# Patient Record
Sex: FEMALE | Race: WHITE | NOT HISPANIC OR LATINO | URBAN - METROPOLITAN AREA
[De-identification: names, ages, dates, MRNs, and addresses within clinical notes are randomized per-mention and may not be internally consistent; named-entity substitution may affect disease eponyms.]

---

## 2023-07-25 NOTE — TELEPHONE ENCOUNTER
Diagnosis, Referred by & from: Hemorrhoids, self-referred   Appt date: 10/4/2023   NOTES STATUS DETAILS   OFFICE NOTE from referring provider Care Everywhere [facility]:  3/24/23, 1/20/21 - [facility] OV with CHARIS Hayes   OFFICE NOTE from other specialist Received [facility]:  6/25/19, 4/29/19 - [facility] OV with Dr. Palmer  10/28/16 - [facility] OV with CHARIS Rollins   DISCHARGE SUMMARY from hospital N/A    DISCHARGE REPORT from the ER N/A    OPERATIVE REPORT Care Everywhere [facility]:  7/26/19 - OP Note for ANAL EXAM WITH BOTOX INJECTION with Dr. Palmer   MEDICATION LIST Care Everywhere    LABS N/A    DIAGNOSTIC PROCEDURES N/A    IMAGING (DISC & REPORT) N/A       Records Requested  07/25/23    Facility  [facility]  Fax: [phone]   Outcome * 7/25/23 12:39 PM [facility] - Odalys    * 8/23/23 10:24 AM Faxed 2nd urg req to [facility] for records to be faxed to the clinic. - Odalys    * 8/28/23 2:08 PM Mailed out DENY form to the patient to get the [facility] records. - Odalys    * 9/20/23 8:06 AM Signed DENY received from patient and sent urg req to [facility] with DENY attached for recs to be urgently faxed to the clinic. - Odalys    * 9/20/23 11:47 AM Records received from [facility] and sent to [facility] to be scanned into the chart. - Odalys

## 2023-09-18 ENCOUNTER — TELEPHONE (OUTPATIENT)
Dept: ENDOCRINOLOGY | Facility: CLINIC | Age: 53
End: 2023-09-18
Payer: COMMERCIAL

## 2023-09-18 NOTE — PROGRESS NOTES
Colon and Rectal Surgery Consult Clinic Note    RE: Payal Long  : 1970  VIDHI: 10/4/2023      Payal Long is a very pleasant 53 year old female with concerns for hemorrhoids. Given these findings they were subsequently sent to the [facility] for a second opinion on this and a new patient consultation.     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 History of examination under anesthesia with Botox injection for a posterior anal fissure with Dr. Palmer in 2019.  She now feels that she has something that feels up and explodes a few times a week with blood.  This happens without even having a bowel movement and she has to wear panty liner because of it.  No significant pain with or between bowel movements.  No purulent drainage.  She recently had thyroiditis from a virus and initially had diarrhea but now is having some constipation is having to take laxatives and fiber supplements for this.  No fecal incontinence.  She had a colonoscopy last in  but Cologuard was negative in the past few years.  No family history of any colon cancer.  She has had 2 vaginal births with episiotomy and 1 vacuum extraction.      PLEASE SEE NOTE BELOW FOR PHYSICAL EXAMINATION, REVIEW OF SYSTEMS, AND OTHER HISTORY.    Assessment/Plan: 53 year old female with mucosal prolapse.  On exam she has hemorrhoidal/mucosal prolapse in the anterior position.  I think this is likely the source of the intermittent bleeding.  We discussed management with surgical intervention versus hemorrhoid banding and discussed the possible need for serial banding if symptoms do not resolve or further surgical intervention.  She stated an understanding of this and wished to proceed with banding today.  She tolerated this well.  We will have her return to clinic after 1 month if she has any recurrent symptoms.  I did advise a colonoscopy since her last was in  and she is  agreeable to this.    15 minutes spent on the date of encounter performing chart review, history and exam, documentation and further activities as noted above with an additional 5 minutes for anoscopy and banding.     Corrina Jones MD  Colon and Rectal Surgery Staff  [facility]      -------------------------------------------------------------------------------------------------------------------      Medical history:  No past medical history on file.    Surgical history:  No past surgical history on file.    Problem list:  There are no problems to display for this patient.      Medications:  Current Outpatient Medications   Medication Sig Dispense Refill    atenolol (TENORMIN) 25 MG tablet Take 1 tablet by mouth daily      traZODone (DESYREL) 50 MG tablet Take 50 mg by mouth      methimazole (TAPAZOLE) 5 MG tablet TAKE 4 TABLETS BY MOUTH TWICE DAILY         Allergies:  No Known Allergies    Family history:  No family history on file.    Social history:  Social History     Socioeconomic History    Marital status:      Spouse name: Not on file    Number of children: Not on file    Years of education: Not on file    Highest education level: Not on file   Occupational History    Not on file   Tobacco Use    Smoking status: Never    Smokeless tobacco: Never   Substance and Sexual Activity    Alcohol use: Not on file    Drug use: Not on file    Sexual activity: Not on file   Other Topics Concern    Not on file   Social History Narrative    Not on file     Social Determinants of Health     Financial Resource Strain: Not on file   Food Insecurity: Not on file   Transportation Needs: Not on file   Physical Activity: Not on file   Stress: Not on file   Social Connections: Not on file   Interpersonal Safety: Not on file   Housing Stability: Not on file         Nursing Notes:   Orion Larsen, EMT  10/4/2023  3:06 PM  Signed  Chief Complaint   Patient presents with    Consult        Vitals:    10/04/23 1502   BP: 118/76   BP Location: Left arm   Patient Position: Sitting   Cuff Size: Adult Regular   Pulse: 82   SpO2: 97%       There is no height or weight on file to calculate BMI.    Orion Larsen EMT-P       Physical Examination:  /76 (BP Location: Left arm, Patient Position: Sitting, Cuff Size: Adult Regular)   Pulse 82   SpO2 97%   General: Alert, oriented, in no acute distress, sitting comfortably    Perianal external examination:  Perianal skin: Intact with no excoriation or lichenification.  Lesions: No evidence of an external lesion, nodularity, or induration in the perianal region.  Eversion of buttocks: There was not evidence of an anal fissure. Details: N/A.  Skin tags or external hemorrhoids: Yes: hemorrhoidal prolapse in the anterior position without bleeding. This was easily manually reducible.    Digital rectal examination: Was performed.   Sphincter tone: Good.  Palpable lesions: No.  Other: None.  Bimanual examination: was not performed.    Anoscopy: Was performed.   Hemorrhoids: Yes. Prolapsing hemorrhoid without bleeding in the anterior position. Smaller hemorrhoid in the right lateral position  Lesions: No.      Procedures:  After discussing the risks and benefits, the patient agreed to proceed with internal hemorrhoidal banding.    Prior to the start of the procedure and with procedural staff participation, I verbally confirmed the patient s identity using two indicators, relevant allergies, that the procedure was appropriate and matched the consent or emergent situation, and that the correct equipment/implants were available. Immediately prior to starting the procedure I conducted the Time Out with the procedural staff and re-confirmed the patient s name, procedure, and site/side. (The Joint Commission universal protocol was followed.)  Yes    Sedation (Moderate or Deep): None    A suction hemorrhoidal  was used to place a total of 1 band(s) in the  anterior position(s).    There was no significant bleeding. The patient tolerated the procedure well.    This procedure was performed under a collaborative privileging agreement with Dr. Reggie Diaz, Chief Division of Colon and Rectal Surgery

## 2023-09-18 NOTE — TELEPHONE ENCOUNTER
Call placed to Payal as we received the following referral  message -   We have a received a new appointment request for the [facility] Rare Disease team:  First Name: Payal  Last Name: Ethan  YOB: 1970  Email: [email or web address]  Phone: [phone]  Address: [address] [place] [address]  Condition: Hyperthyroid Disease  Requested Provider: Dr. Samir Long  Reason for Appointment: Thyroid issue- At [facility], have been diagnosed with hyperthyroid disease 09/09/23 need an endocrinologist ALPESH Zelaya,  Team [facility]    I explained that Dr. Andre Long is a pediatric endocrinologist.  Payal was not sure where the referral came from but that her  has been helping her look around for an endocrine provider.  I gave Payal the number to schedule with Adult endocrinology here at the [facility] if she would like to do that.   She was appreciative of the call.

## 2023-10-04 ENCOUNTER — PRE VISIT (OUTPATIENT)
Dept: SURGERY | Facility: CLINIC | Age: 53
End: 2023-10-04

## 2023-10-04 ENCOUNTER — OFFICE VISIT (OUTPATIENT)
Dept: SURGERY | Facility: CLINIC | Age: 53
End: 2023-10-04
Payer: COMMERCIAL

## 2023-10-04 VITALS — OXYGEN SATURATION: 97 % | DIASTOLIC BLOOD PRESSURE: 76 MMHG | HEART RATE: 82 BPM | SYSTOLIC BLOOD PRESSURE: 118 MMHG

## 2023-10-04 DIAGNOSIS — K64.8 HEMORRHOID PROLAPSE: ICD-10-CM

## 2023-10-04 DIAGNOSIS — Z12.11 ENCOUNTER FOR SCREENING COLONOSCOPY: Primary | ICD-10-CM

## 2023-10-04 PROCEDURE — 46221 LIGATION OF HEMORRHOID(S): CPT | Performed by: COLON & RECTAL SURGERY

## 2023-10-04 PROCEDURE — 99202 OFFICE O/P NEW SF 15 MIN: CPT | Mod: 25 | Performed by: COLON & RECTAL SURGERY

## 2023-10-04 RX ORDER — ATENOLOL 25 MG/1
1 TABLET ORAL DAILY
COMMUNITY
Start: 2023-09-12

## 2023-10-04 RX ORDER — TRAZODONE HYDROCHLORIDE 50 MG/1
50 TABLET, FILM COATED ORAL
COMMUNITY
Start: 2023-03-24

## 2023-10-04 RX ORDER — METHIMAZOLE 5 MG/1
TABLET ORAL
COMMUNITY

## 2023-10-04 ASSESSMENT — ENCOUNTER SYMPTOMS
SYNCOPE: 0
SLEEP DISTURBANCES DUE TO BREATHING: 0
WEIGHT LOSS: 1
DECREASED APPETITE: 1
FATIGUE: 1
JAUNDICE: 0
FEVER: 1
HYPERTENSION: 0
PANIC: 0
NAIL CHANGES: 0
NIGHT SWEATS: 1
HEARTBURN: 0
SMELL DISTURBANCE: 0
INCREASED ENERGY: 1
SKIN CHANGES: 0
HYPOTENSION: 0
CONSTIPATION: 1
TASTE DISTURBANCE: 0
TROUBLE SWALLOWING: 0
BOWEL INCONTINENCE: 0
BLOOD IN STOOL: 0
DIARRHEA: 1
ABDOMINAL PAIN: 1
CHILLS: 0
BLOATING: 0
INSOMNIA: 1
VOMITING: 0
POLYPHAGIA: 0
WEIGHT GAIN: 0
NAUSEA: 0
SINUS CONGESTION: 1
DEPRESSION: 0
LEG PAIN: 1
RECTAL PAIN: 1
PALPITATIONS: 1
DECREASED CONCENTRATION: 1
SINUS PAIN: 0
POLYDIPSIA: 0
SORE THROAT: 0
ALTERED TEMPERATURE REGULATION: 1
ORTHOPNEA: 0
HALLUCINATIONS: 0
EXERCISE INTOLERANCE: 1
LIGHT-HEADEDNESS: 0
NERVOUS/ANXIOUS: 1
NECK MASS: 1
HOARSE VOICE: 1

## 2023-10-04 ASSESSMENT — PAIN SCALES - GENERAL: PAINLEVEL: NO PAIN (0)

## 2023-10-04 NOTE — NURSING NOTE
Chief Complaint   Patient presents with    Consult       Vitals:    10/04/23 1502   BP: 118/76   BP Location: Left arm   Patient Position: Sitting   Cuff Size: Adult Regular   Pulse: 82   SpO2: 97%       There is no height or weight on file to calculate BMI.    Orion Larsen EMT-P

## 2023-10-04 NOTE — LETTER
10/4/2023       RE: Payal Long  [address] [place] [address]     Dear Colleague,    Thank you for referring your patient, Payal Long, to the [facility] [place] at [facility]. Please see a copy of my visit note below.    Colon and Rectal Surgery Consult Clinic Note    RE: Payal Long  : 1970  VIDHI: 10/4/2023      Payal Long is a very pleasant 53 year old female with concerns for hemorrhoids. Given these findings they were subsequently sent to the [facility] for a second opinion on this and a new patient consultation.     HISTORY OF PRESENT ILNESS:  Payal has had an intermittent anal fissure since about . History of examination under anesthesia with Botox injection for a posterior anal fissure with Dr. Palmer in 2019.  She now feels that she has something that feels up and explodes a few times a week with blood.  This happens without even having a bowel movement and she has to wear panty liner because of it.  No significant pain with or between bowel movements.  No purulent drainage.  She recently had thyroiditis from a virus and initially had diarrhea but now is having some constipation is having to take laxatives and fiber supplements for this.  No fecal incontinence.  She had a colonoscopy last in  but Cologuard was negative in the past few years.  No family history of any colon cancer.  She has had 2 vaginal births with episiotomy and 1 vacuum extraction.      PLEASE SEE NOTE BELOW FOR PHYSICAL EXAMINATION, REVIEW OF SYSTEMS, AND OTHER HISTORY.    Assessment/Plan: 53 year old female with mucosal prolapse.  On exam she has hemorrhoidal/mucosal prolapse in the anterior position.  I think this is likely the source of the intermittent bleeding.  We discussed management with surgical intervention versus hemorrhoid banding and discussed the possible need for serial  banding if symptoms do not resolve or further surgical intervention.  She stated an understanding of this and wished to proceed with banding today.  She tolerated this well.  We will have her return to clinic after 1 month if she has any recurrent symptoms.  I did advise a colonoscopy since her last was in 2010 and she is agreeable to this.    15 minutes spent on the date of encounter performing chart review, history and exam, documentation and further activities as noted above with an additional 5 minutes for anoscopy and banding.     Corrina Jones MD  Colon and Rectal Surgery Staff  [facility]      -------------------------------------------------------------------------------------------------------------------      Medical history:  No past medical history on file.    Surgical history:  No past surgical history on file.    Problem list:  There are no problems to display for this patient.      Medications:  Current Outpatient Medications   Medication Sig Dispense Refill    atenolol (TENORMIN) 25 MG tablet Take 1 tablet by mouth daily      traZODone (DESYREL) 50 MG tablet Take 50 mg by mouth      methimazole (TAPAZOLE) 5 MG tablet TAKE 4 TABLETS BY MOUTH TWICE DAILY         Allergies:  No Known Allergies    Family history:  No family history on file.    Social history:  Social History     Socioeconomic History    Marital status:      Spouse name: Not on file    Number of children: Not on file    Years of education: Not on file    Highest education level: Not on file   Occupational History    Not on file   Tobacco Use    Smoking status: Never    Smokeless tobacco: Never   Substance and Sexual Activity    Alcohol use: Not on file    Drug use: Not on file    Sexual activity: Not on file   Other Topics Concern    Not on file   Social History Narrative    Not on file     Social Determinants of Health     Financial Resource Strain: Not on file   Food Insecurity: Not on file    Transportation Needs: Not on file   Physical Activity: Not on file   Stress: Not on file   Social Connections: Not on file   Interpersonal Safety: Not on file   Housing Stability: Not on file         Nursing Notes:   Orion Larsen, EMT  10/4/2023  3:06 PM  Signed  Chief Complaint   Patient presents with    Consult       Vitals:    10/04/23 1502   BP: 118/76   BP Location: Left arm   Patient Position: Sitting   Cuff Size: Adult Regular   Pulse: 82   SpO2: 97%       There is no height or weight on file to calculate BMI.    Orion Larsen EMT-P       Physical Examination:  /76 (BP Location: Left arm, Patient Position: Sitting, Cuff Size: Adult Regular)   Pulse 82   SpO2 97%   General: Alert, oriented, in no acute distress, sitting comfortably    Perianal external examination:  Perianal skin: Intact with no excoriation or lichenification.  Lesions: No evidence of an external lesion, nodularity, or induration in the perianal region.  Eversion of buttocks: There was not evidence of an anal fissure. Details: N/A.  Skin tags or external hemorrhoids: Yes: hemorrhoidal prolapse in the anterior position without bleeding. This was easily manually reducible.    Digital rectal examination: Was performed.   Sphincter tone: Good.  Palpable lesions: No.  Other: None.  Bimanual examination: was not performed.    Anoscopy: Was performed.   Hemorrhoids: Yes. Prolapsing hemorrhoid without bleeding in the anterior position. Smaller hemorrhoid in the right lateral position  Lesions: No.      Procedures:  After discussing the risks and benefits, the patient agreed to proceed with internal hemorrhoidal banding.    Prior to the start of the procedure and with procedural staff participation, I verbally confirmed the patient s identity using two indicators, relevant allergies, that the procedure was appropriate and matched the consent or emergent situation, and that the correct equipment/implants were available. Immediately prior to  starting the procedure I conducted the Time Out with the procedural staff and re-confirmed the patient s name, procedure, and site/side. (The Joint Commission universal protocol was followed.)  Yes    Sedation (Moderate or Deep): None    A suction hemorrhoidal  was used to place a total of 1 band(s) in the anterior position(s).    There was no significant bleeding. The patient tolerated the procedure well.    This procedure was performed under a collaborative privileging agreement with Dr. Reggie Diaz, Chief Division of Colon and Rectal Surgery        Again, thank you for allowing me to participate in the care of your patient.      Sincerely,    Corrina Jones MD

## 2023-10-31 ENCOUNTER — TELEPHONE (OUTPATIENT)
Dept: GASTROENTEROLOGY | Facility: CLINIC | Age: 53
End: 2023-10-31
Payer: COMMERCIAL

## 2023-10-31 NOTE — TELEPHONE ENCOUNTER
"Endoscopy Scheduling Screen    Have you had a positive Covid test in the last 14 days?  No    Are you active on MyChart?   Yes    What insurance is in the chart?  Other:  [employer]    Ordering/Referring Provider:     GLENNA GIFFORD      (If ordering provider performs procedure, schedule with ordering provider unless otherwise instructed. )    BMI: 21.63     Sedation Ordered  moderate sedation.   If patient BMI > 50 do not schedule in [facility].    If patient BMI > 45 do not schedule at [facility].    Are you taking methadone or Suboxone?  No    Are you taking any prescription medications for pain 3 or more times per week?   No    Do you have a history of malignant hyperthermia or adverse reaction to anesthesia?  Yes (Continue with scheduling. Nurse will notify anesthesia at scheduled location for eval.) - PT SAID SHE IS ALLERGIC TO FENTANYL     (Females) Are you currently pregnant?   No     Have you been diagnosed or told you have pulmonary hypertension?   No    Do you have an LVAD?  No    Have you been told you have moderate to severe sleep apnea?  No    Have you been told you have COPD, asthma, or any other lung disease?  No    Do you have any heart conditions?  No     Have you ever had an organ transplant?   No    Have you ever had or are you awaiting a heart or lung transplant?   No    Have you had a stroke or transient ischemic attack (TIA aka \"mini stroke\" in the last 6 months?   No    Have you been diagnosed with or been told you have cirrhosis of the liver?   No    Are you currently on dialysis?   No    Do you need assistance transferring?   No    BMI: 21.63     Is patients BMI > 40 and scheduling location [facility]?  No    Do you take an injectable medication for weight loss or diabetes (excluding insulin)?  No    Do you take the medication Naltrexone?  No    Do you take blood thinners?  No       Prep   Are you currently on dialysis or do you have chronic kidney disease?  No    Do you have a diagnosis of " diabetes?  No    Do you have a diagnosis of cystic fibrosis (CF)?  No    On a regular basis do you go 3 -5 days between bowel movements?  No    BMI > 40?  No    Preferred Pharmacy:    [id number] - [place], [place] - [address] AT [facility] [address]  [place] [address]  Phone: [phone] Fax: [phone]      Final Scheduling Details   MESSAGE SENT TO MI MORAN TO DETERMINE WHAT DATES DR. CLEMENT IS AVAILABLE. PT HAD TO GO BACK TO WORK AND SAID THEY WOULD CALL BACK LATER TO FINISH SCHEDULING.

## 2023-11-01 NOTE — TELEPHONE ENCOUNTER
1st attempt, LVM TO CALL BACK. MESSAGE BACK FROM DR. CLEMENT STAFF WAS TO SCHEDULE PT WITH ANY PROVIDER.

## 2023-11-08 ENCOUNTER — OFFICE VISIT (OUTPATIENT)
Dept: SURGERY | Facility: CLINIC | Age: 53
End: 2023-11-08
Payer: COMMERCIAL

## 2023-11-08 VITALS — HEART RATE: 88 BPM | DIASTOLIC BLOOD PRESSURE: 72 MMHG | OXYGEN SATURATION: 100 % | SYSTOLIC BLOOD PRESSURE: 117 MMHG

## 2023-11-08 DIAGNOSIS — K64.8 HEMORRHOID PROLAPSE: Primary | ICD-10-CM

## 2023-11-08 PROCEDURE — 46221 LIGATION OF HEMORRHOID(S): CPT

## 2023-11-08 ASSESSMENT — PAIN SCALES - GENERAL: PAINLEVEL: NO PAIN (0)

## 2023-11-08 NOTE — LETTER
2023       RE: Payal Long  [address] [place] [address]     Dear Colleague,    Thank you for referring your patient, Payal Long, to the [facility] [place] at [facility]. Please see a copy of my visit note below.    Colon and Rectal Surgery Follow-Up Clinic Note    RE: Payal Long  : 1970  VIDHI: 2023    Payal Long is a very pleasant 53 year old female here for follow-up of internal hemorrhoids.    Interval history: Payal was last in clinic a month ago and had banding x1 with Dr. Jones. She has been doing well. She still has rectal bleeding and is unsure if the banding improved this. The last time she had bleeding was 2 weeks ago and it lasted for a week. She did have a small amount of rectal pain during that time too.     Physical Examination: Exam was chaperoned by Orion Larsen, EMT-P   /72 (BP Location: Left arm, Patient Position: Sitting, Cuff Size: Adult Regular)   Pulse 88   SpO2 100%   General: Alert, oriented, in no acute distress, sitting comfortably  Perianal external examination:  Perianal skin: Intact with no excoriation or lichenification.  Lesions: No evidence of an external lesion, nodularity, or induration in the perianal region.  Eversion of buttocks: There was not evidence of an anal fissure. Details: N/A.  Skin tags or external hemorrhoids: Yes: small hemorrhoidal prolapse in the anterior position without bleeding.      Digital rectal examination: Was performed.   Sphincter tone: Good.  Palpable lesions: No.  Other: None.  Bimanual examination: was not performed.     Anoscopy: Was performed.   Hemorrhoids: Yes. Moderate sized hemorrhoid in RA  Lesions: No.    Procedures:  After discussing the risks and benefits, the patient agreed to proceed with internal hemorrhoidal banding.    Prior to the start of the procedure and with procedural staff  participation, I verbally confirmed the patient s identity using two indicators, relevant allergies, that the procedure was appropriate and matched the consent or emergent situation, and that the correct equipment/implants were available. Immediately prior to starting the procedure I conducted the Time Out with the procedural staff and re-confirmed the patient s name, procedure, and site/side. (The Joint Commission universal protocol was followed.)  Yes    Sedation (Moderate or Deep): None    A suction hemorrhoidal  was used to place a total of 1 band(s) in the right anterior position(s).    There was no significant bleeding. The patient tolerated the procedure well.    Procedure was performed under collaborating agreement with Dr. Reggie Diaz MD, Chief of the Division of Colon and Rectal Surgery      Assessment/Plan: 53 year old female with symptomatic internal hemorrhoids. She did have some recurrent bleeding but the timing of this might be related to the band falling off. We discussed repeating banding today and she is interested. Again discussed risks and expectations. See procedure note above. No aspirin for 2 weeks. Recommend a daily fiber supplement. Follow up as needed. Patient's questions were answered to her stated satisfaction and she is in agreement with this plan.     Medical history:  No past medical history on file.    Surgical history:  No past surgical history on file.    Problem list:  There are no problems to display for this patient.      Medications:  Current Outpatient Medications   Medication Sig Dispense Refill    atenolol (TENORMIN) 25 MG tablet Take 1 tablet by mouth daily      methimazole (TAPAZOLE) 5 MG tablet TAKE 4 TABLETS BY MOUTH TWICE DAILY      traZODone (DESYREL) 50 MG tablet Take 50 mg by mouth         Allergies:  No Known Allergies    Family history:  No family history on file.    Social history:  Social History     Tobacco Use    Smoking status: Never    Smokeless  tobacco: Never   Substance Use Topics    Alcohol use: Not on file     Marital status: .  Occupation: .    Nursing Notes:   Orion Larsen, EMT  11/8/2023  4:06 PM  Signed  Chief Complaint   Patient presents with    Follow Up       Vitals:    11/08/23 1605   BP: 117/72   BP Location: Left arm   Patient Position: Sitting   Cuff Size: Adult Regular   Pulse: 88   SpO2: 100%     There is no height or weight on file to calculate BMI.    Orion Larsen EMT-P    15 minutes spent on the date of encounter performing chart review, history and exam, documentation and further activities as noted above with an additional 5 minutes for anoscopy and banding.     Again, thank you for allowing me to participate in the care of your patient.      Sincerely,    Glendy Fagan PA-C

## 2023-11-08 NOTE — PROGRESS NOTES
Colon and Rectal Surgery Follow-Up Clinic Note    RE: Payal Long  : 1970  VIDHI: 2023    Payal Long is a very pleasant 53 year old female here for follow-up of internal hemorrhoids.    Interval history: Payal was last in clinic a month ago and had banding x1 with Dr. Jones. She has been doing well. She still has rectal bleeding and is unsure if the banding improved this. The last time she had bleeding was 2 weeks ago and it lasted for a week. She did have a small amount of rectal pain during that time too.     Physical Examination: Exam was chaperoned by Orion Larsen, EMT-P   /72 (BP Location: Left arm, Patient Position: Sitting, Cuff Size: Adult Regular)   Pulse 88   SpO2 100%   General: Alert, oriented, in no acute distress, sitting comfortably  Perianal external examination:  Perianal skin: Intact with no excoriation or lichenification.  Lesions: No evidence of an external lesion, nodularity, or induration in the perianal region.  Eversion of buttocks: There was not evidence of an anal fissure. Details: N/A.  Skin tags or external hemorrhoids: Yes: small hemorrhoidal prolapse in the anterior position without bleeding.      Digital rectal examination: Was performed.   Sphincter tone: Good.  Palpable lesions: No.  Other: None.  Bimanual examination: was not performed.     Anoscopy: Was performed.   Hemorrhoids: Yes. Moderate sized hemorrhoid in RA  Lesions: No.    Procedures:  After discussing the risks and benefits, the patient agreed to proceed with internal hemorrhoidal banding.    Prior to the start of the procedure and with procedural staff participation, I verbally confirmed the patient s identity using two indicators, relevant allergies, that the procedure was appropriate and matched the consent or emergent situation, and that the correct equipment/implants were available. Immediately prior to starting the procedure I conducted the Time Out with the procedural staff and  re-confirmed the patient s name, procedure, and site/side. (The Joint Commission universal protocol was followed.)  Yes    Sedation (Moderate or Deep): None    A suction hemorrhoidal  was used to place a total of 1 band(s) in the right anterior position(s).    There was no significant bleeding. The patient tolerated the procedure well.    Procedure was performed under collaborating agreement with Dr. Reggie Diaz MD, Chief of the Division of Colon and Rectal Surgery      Assessment/Plan: 53 year old female with symptomatic internal hemorrhoids. She did have some recurrent bleeding but the timing of this might be related to the band falling off. We discussed repeating banding today and she is interested. Again discussed risks and expectations. See procedure note above. No aspirin for 2 weeks. Recommend a daily fiber supplement. Follow up as needed. Patient's questions were answered to her stated satisfaction and she is in agreement with this plan.     Medical history:  No past medical history on file.    Surgical history:  No past surgical history on file.    Problem list:  There are no problems to display for this patient.      Medications:  Current Outpatient Medications   Medication Sig Dispense Refill    atenolol (TENORMIN) 25 MG tablet Take 1 tablet by mouth daily      methimazole (TAPAZOLE) 5 MG tablet TAKE 4 TABLETS BY MOUTH TWICE DAILY      traZODone (DESYREL) 50 MG tablet Take 50 mg by mouth         Allergies:  No Known Allergies    Family history:  No family history on file.    Social history:  Social History     Tobacco Use    Smoking status: Never    Smokeless tobacco: Never   Substance Use Topics    Alcohol use: Not on file     Marital status: .  Occupation: .    Nursing Notes:   Orion Larsen, EMT  11/8/2023  4:06 PM  Signed  Chief Complaint   Patient presents with    Follow Up       Vitals:    11/08/23 1605   BP: 117/72   BP Location: Left arm   Patient Position:  Sitting   Cuff Size: Adult Regular   Pulse: 88   SpO2: 100%       There is no height or weight on file to calculate BMI.    Orion Larsen EMT-P      15 minutes spent on the date of encounter performing chart review, history and exam, documentation and further activities as noted above with an additional 5 minutes for anoscopy and banding.     -----------------------------------------------------  Glendy Fagan PA-C  Colon and Rectal Surgery  [facility]

## 2023-11-08 NOTE — NURSING NOTE
Chief Complaint   Patient presents with    Follow Up       Vitals:    11/08/23 1605   BP: 117/72   BP Location: Left arm   Patient Position: Sitting   Cuff Size: Adult Regular   Pulse: 88   SpO2: 100%       There is no height or weight on file to calculate BMI.    Orion Larsen EMT-P

## 2023-12-24 ENCOUNTER — HEALTH MAINTENANCE LETTER (OUTPATIENT)
Age: 53
End: 2023-12-24

## 2024-03-01 ENCOUNTER — TELEPHONE (OUTPATIENT)
Dept: GASTROENTEROLOGY | Facility: CLINIC | Age: 54
End: 2024-03-01
Payer: COMMERCIAL

## 2024-03-01 NOTE — TELEPHONE ENCOUNTER
"Endoscopy Scheduling Screen    Have you had a positive Covid test in the last 14 days?  No    Are you active on MyChart?   No    What insurance is in the chart?  Other:  [employer]    Ordering/Referring Provider:   GLENNA GIFFORD        (If ordering provider performs procedure, schedule with ordering provider unless otherwise instructed. )    BMI: There is no height or weight on file to calculate BMI.     21.63    Sedation Ordered  moderate sedation.   If patient BMI > 50 do not schedule in [facility].    If patient BMI > 45 do not schedule at [facility].    Are you taking methadone or Suboxone?  No    Have you had difficulties, pain, or discomfort during past endoscopy procedures?  No    Are you taking any prescription medications for pain 3 or more times per week?   NO - No RN review required.    Do you have a history of malignant hyperthermia or adverse reaction to anesthesia?  Yes (Continue with scheduling. Nurse will notify anesthesia at scheduled location for eval.)    Have you been diagnosed or told you have pulmonary hypertension?   No    Do you have an LVAD?  No    Have you been told you have moderate to severe sleep apnea?  No    Have you been told you have COPD, asthma, or any other lung disease?  No    Do you have any heart conditions?  No     Have you ever had an organ transplant?   No    Have you ever had or are you awaiting a heart or lung transplant?   No    Have you had a stroke or transient ischemic attack (TIA aka \"mini stroke\" in the last 6 months?   No    Have you been diagnosed with or been told you have cirrhosis of the liver?   No    Are you currently on dialysis?   No    Do you need assistance transferring?   No    BMI: There is no height or weight on file to calculate BMI.     21.63    Is patients BMI > 40 and scheduling location [facility]?  No    Do you take an injectable medication for weight loss or diabetes (excluding insulin)?  No    Do you take the medication Naltrexone?  No    Do you take " blood thinners?  No       Prep   Are you currently on dialysis or do you have chronic kidney disease?  No    Do you have a diagnosis of diabetes?  No    Do you have a diagnosis of cystic fibrosis (CF)?  No    On a regular basis do you go 3 -5 days between bowel movements?  No    BMI > 40?  No    Preferred Pharmacy:    [id number] - [place], [place] - [address] [place] [address] AT [facility] [address]  [address] [place] [address]  [place] [address]  Phone: [phone] Fax: [phone]      Final Scheduling Details   Colonoscopy prep sent?  N/A    Procedure scheduled  Colonoscopy    Surgeon:  ALONSO     Date of procedure:  06/10/2024     Pre-OP / PAC:   No - Not required for this site.    Location  [facility] - Patient preference.    Sedation   Moderate Sedation - Per order.      Patient Reminders:   You will receive a call from a Nurse to review instructions and health history.  This assessment must be completed prior to your procedure.  Failure to complete the Nurse assessment may result in the procedure being cancelled.      On the day of your procedure, please designate an adult(s) who can drive you home stay with you for the next 24 hours. The medicines used in the exam will make you sleepy. You will not be able to drive.      You cannot take public transportation, ride share services, or non-medical taxi service without a responsible caregiver.  Medical transport services are allowed with the requirement that a responsible caregiver will receive you at your destination.  We require that drivers and caregivers are confirmed prior to your procedure.

## 2024-03-21 ENCOUNTER — LAB REQUISITION (OUTPATIENT)
Dept: LAB | Facility: CLINIC | Age: 54
End: 2024-03-21

## 2024-03-21 DIAGNOSIS — N93.9 ABNORMAL UTERINE AND VAGINAL BLEEDING, UNSPECIFIED: ICD-10-CM

## 2024-03-21 DIAGNOSIS — R32 UNSPECIFIED URINARY INCONTINENCE: ICD-10-CM

## 2024-03-21 PROCEDURE — 88305 TISSUE EXAM BY PATHOLOGIST: CPT | Performed by: PATHOLOGY

## 2024-03-21 PROCEDURE — 87086 URINE CULTURE/COLONY COUNT: CPT | Performed by: OBSTETRICS & GYNECOLOGY

## 2024-03-23 LAB — BACTERIA UR CULT: NORMAL

## 2024-03-25 LAB
PATH REPORT.COMMENTS IMP SPEC: NORMAL
PATH REPORT.COMMENTS IMP SPEC: NORMAL
PATH REPORT.FINAL DX SPEC: NORMAL
PATH REPORT.GROSS SPEC: NORMAL
PATH REPORT.MICROSCOPIC SPEC OTHER STN: NORMAL
PATH REPORT.RELEVANT HX SPEC: NORMAL
PHOTO IMAGE: NORMAL

## 2024-05-12 ENCOUNTER — HEALTH MAINTENANCE LETTER (OUTPATIENT)
Age: 54
End: 2024-05-12

## 2024-05-29 ENCOUNTER — TELEPHONE (OUTPATIENT)
Dept: GASTROENTEROLOGY | Facility: CLINIC | Age: 54
End: 2024-05-29
Payer: COMMERCIAL

## 2024-05-29 NOTE — TELEPHONE ENCOUNTER
Attempted to contact patient in order to complete pre assessment questions.     No answer. Left message to return call to [phone] option 4    Callback required communication sent via [employer].    Pily Lu RN  Endoscopy Procedure Pre Assessment

## 2024-05-29 NOTE — TELEPHONE ENCOUNTER
Pre visit planning completed.      Procedure details:    Patient scheduled for Colonoscopy  on 6/10/2024.     Arrival time: 1245. Procedure time 1345    Facility location: [facility]; [address] [place] [address], [address] Floor, [place] [address]. Check in location: Main entrance at registration desk.    Sedation type: Conscious sedation     Pre op exam needed? N/A    Indication for procedure: Encounter for screening colonoscopy [Z12.11]       Chart review:     Electronic implanted devices? No    Recent diagnosis of diverticulitis within the last 6 weeks? No    Diabetic? No      Medication review:    Anticoagulants? No    NSAIDS? Yes.  Meloxicam (Mobic).  Holding interval of 10 days.    Other medication HOLDING recommendations:  N/A      Prep for procedure:     Bowel prep recommendation: Standard Miralax  Due to: standard bowel prep.    Prep instructions sent via [employer]         Pily Lu RN  Endoscopy Procedure Pre Assessment RN  [phone] option 4

## 2024-05-30 NOTE — TELEPHONE ENCOUNTER
Pre assessment completed for upcoming procedure.   (Please see previous telephone encounter notes for complete details)    Patient  returned call.       Procedure details:    Arrival time and facility location reviewed.    Pre op exam needed? N/A    Designated  policy reviewed. Instructed to have someone stay 6  hours post procedure.       Medication review:    NSAID medication(s): Meloxicam (Mobic): HOLD 10 days before procedure.      Prep for procedure:     Procedure prep instructions reviewed.        Any additional information needed:  N/A      Patient  verbalized understanding and had no questions or concerns at this time.      Meggan Hutchison RN  Endoscopy Procedure Pre Assessment   [phone] option 4

## 2024-06-10 ENCOUNTER — HOSPITAL ENCOUNTER (OUTPATIENT)
Facility: CLINIC | Age: 54
Discharge: HOME | End: 2024-06-10
Attending: SURGERY | Admitting: SURGERY
Payer: COMMERCIAL

## 2024-06-10 VITALS
SYSTOLIC BLOOD PRESSURE: 96 MMHG | RESPIRATION RATE: 16 BRPM | HEART RATE: 94 BPM | OXYGEN SATURATION: 99 % | DIASTOLIC BLOOD PRESSURE: 69 MMHG

## 2024-06-10 LAB — COLONOSCOPY: NORMAL

## 2024-06-10 PROCEDURE — G0121 COLON CA SCRN NOT HI RSK IND: HCPCS | Performed by: SURGERY

## 2024-06-10 PROCEDURE — 45378 DIAGNOSTIC COLONOSCOPY: CPT | Performed by: SURGERY

## 2024-06-10 PROCEDURE — 99153 MOD SED SAME PHYS/QHP EA: CPT | Performed by: SURGERY

## 2024-06-10 PROCEDURE — 250N000011 HC RX IP 250 OP 636: Mod: JZ | Performed by: SURGERY

## 2024-06-10 PROCEDURE — G0500 MOD SEDAT ENDO SERVICE >5YRS: HCPCS | Performed by: SURGERY

## 2024-06-10 RX ORDER — NALOXONE HYDROCHLORIDE 0.4 MG/ML
0.4 INJECTION, SOLUTION INTRAMUSCULAR; INTRAVENOUS; SUBCUTANEOUS
Status: DISCONTINUED | OUTPATIENT
Start: 2024-06-10 | End: 2024-06-10 | Stop reason: HOSPADM

## 2024-06-10 RX ORDER — LIDOCAINE 40 MG/G
CREAM TOPICAL
Status: DISCONTINUED | OUTPATIENT
Start: 2024-06-10 | End: 2024-06-10 | Stop reason: HOSPADM

## 2024-06-10 RX ORDER — DIPHENHYDRAMINE HYDROCHLORIDE 50 MG/ML
INJECTION INTRAMUSCULAR; INTRAVENOUS PRN
Status: DISCONTINUED | OUTPATIENT
Start: 2024-06-10 | End: 2024-06-10 | Stop reason: HOSPADM

## 2024-06-10 RX ORDER — PROCHLORPERAZINE MALEATE 5 MG
10 TABLET ORAL EVERY 6 HOURS PRN
Status: DISCONTINUED | OUTPATIENT
Start: 2024-06-10 | End: 2024-06-10 | Stop reason: HOSPADM

## 2024-06-10 RX ORDER — ONDANSETRON 4 MG/1
4 TABLET, ORALLY DISINTEGRATING ORAL EVERY 6 HOURS PRN
Status: DISCONTINUED | OUTPATIENT
Start: 2024-06-10 | End: 2024-06-10 | Stop reason: HOSPADM

## 2024-06-10 RX ORDER — FENTANYL CITRATE 50 UG/ML
INJECTION, SOLUTION INTRAMUSCULAR; INTRAVENOUS PRN
Status: DISCONTINUED | OUTPATIENT
Start: 2024-06-10 | End: 2024-06-10 | Stop reason: HOSPADM

## 2024-06-10 RX ORDER — ONDANSETRON 2 MG/ML
4 INJECTION INTRAMUSCULAR; INTRAVENOUS EVERY 6 HOURS PRN
Status: DISCONTINUED | OUTPATIENT
Start: 2024-06-10 | End: 2024-06-10 | Stop reason: HOSPADM

## 2024-06-10 RX ORDER — ONDANSETRON 2 MG/ML
INJECTION INTRAMUSCULAR; INTRAVENOUS PRN
Status: DISCONTINUED | OUTPATIENT
Start: 2024-06-10 | End: 2024-06-10 | Stop reason: HOSPADM

## 2024-06-10 RX ORDER — FLUMAZENIL 0.1 MG/ML
0.2 INJECTION, SOLUTION INTRAVENOUS
Status: DISCONTINUED | OUTPATIENT
Start: 2024-06-10 | End: 2024-06-10 | Stop reason: HOSPADM

## 2024-06-10 RX ORDER — ACETAMINOPHEN 325 MG/1
325-650 TABLET ORAL EVERY 6 HOURS PRN
COMMUNITY

## 2024-06-10 RX ORDER — NALOXONE HYDROCHLORIDE 0.4 MG/ML
0.2 INJECTION, SOLUTION INTRAMUSCULAR; INTRAVENOUS; SUBCUTANEOUS
Status: DISCONTINUED | OUTPATIENT
Start: 2024-06-10 | End: 2024-06-10 | Stop reason: HOSPADM

## 2024-06-10 RX ORDER — ONDANSETRON 2 MG/ML
4 INJECTION INTRAMUSCULAR; INTRAVENOUS
Status: DISCONTINUED | OUTPATIENT
Start: 2024-06-10 | End: 2024-06-10 | Stop reason: HOSPADM

## 2024-06-10 ASSESSMENT — ACTIVITIES OF DAILY LIVING (ADL)
ADLS_ACUITY_SCORE: 35

## 2024-06-10 NOTE — H&P
Payal Long  [id number]  female  54 year old      Reason for procedure/surgery: screening colonoscopy    There is no problem list on file for this patient.      Past Surgical History:  History reviewed. No pertinent surgical history.    Past Medical History: No past medical history on file.    Social History:   Social History     Tobacco Use    Smoking status: Never    Smokeless tobacco: Never   Substance Use Topics    Alcohol use: Not on file       Family History: History reviewed. No pertinent family history.    Allergies:   Allergies   Allergen Reactions    Fentanyl Dizziness, Nausea and Vomiting and Other (See Comments)     When used for sedation - not had patch or PO form       Active Medications:   No current outpatient medications on file.       Systemic Review:   CONSTITUTIONAL: NEGATIVE for fever, chills, change in weight  ENT/MOUTH: NEGATIVE for ear, mouth and throat problems  RESP: NEGATIVE for significant cough or SOB  CV: NEGATIVE for chest pain, palpitations or peripheral edema    Physical Examination:   Vital Signs: /78   Pulse 94   Resp 19   SpO2 99%   GENERAL: healthy, alert and no distress  NECK: no adenopathy, no asymmetry, masses, or scars  RESP: lungs clear to auscultation - no rales, rhonchi or wheezes  CV: regular rate and rhythm, normal S1 S2, no S3 or S4, no murmur, click or rub, no peripheral edema and peripheral pulses strong  ABDOMEN: soft, nontender, no hepatosplenomegaly, no masses and bowel sounds normal  MS: no gross musculoskeletal defects noted, no edema    Plan: Appropriate to proceed as scheduled.      Ruel Yu MD, MD  6/10/2024    PCP:  Aracely Michaels

## 2024-09-16 ENCOUNTER — PRE VISIT (OUTPATIENT)
Dept: ONCOLOGY | Facility: CLINIC | Age: 54
End: 2024-09-16
Payer: COMMERCIAL

## 2024-09-16 ENCOUNTER — PATIENT OUTREACH (OUTPATIENT)
Dept: ONCOLOGY | Facility: CLINIC | Age: 54
End: 2024-09-16
Payer: COMMERCIAL

## 2024-09-16 ENCOUNTER — TRANSCRIBE ORDERS (OUTPATIENT)
Dept: OTHER | Age: 54
End: 2024-09-16

## 2024-09-16 DIAGNOSIS — D50.9 IRON DEFICIENCY ANEMIA: Primary | ICD-10-CM

## 2024-09-16 DIAGNOSIS — R71.8 ABNORMAL RBC: ICD-10-CM

## 2024-09-16 NOTE — TELEPHONE ENCOUNTER
Action    Action Taken 9/16/2024 1:40pm VIANEY     I called [facility]'s IMG Dept [phone] - unavailable. I faxed over a request for imaging.      RECORDS STATUS - ALL OTHER DIAGNOSIS      RECORDS RECEIVED FROM: [facility]    Appt Date: TBD NN WQ   NOTES STATUS DETAILS   OFFICE NOTE from referring provider SELF REFERRAL     MEDICATION LIST In EPIC    LABS     PATHOLOGY REPORTS     ANYTHING RELATED TO DIAGNOSIS CE Labs last updated on 9/4/2024

## 2024-09-16 NOTE — PROGRESS NOTES
Hematology referral reviewed for [facility], see below.    Referral reason: self referral to Dr Flores for low RBC and MCHC, records available in [facility] at [facility]    Clinical question entered by referring provider or through order transcription: Referral called in by patient, self referral, Dr. Aracely Michaels PCP suggested hematologist, per referral requesting Dr. Jitendra Flores, recs at [facility]     Referral received via: Self referral    Current abnormal labs: Available in CareEverywhere    Outreach: Call not placed to patient regarding referral.    Plan: Triage instructions updated and sent to NPS for completion.

## 2024-11-01 DIAGNOSIS — E61.1 IRON DEFICIENCY: Primary | ICD-10-CM

## 2024-11-01 NOTE — PROGRESS NOTES
Hematology/Oncology Consult Note    Payal Long MRN# [id number]   Age: 54 year old YOB: 1970          Reason for Consult:   RBC abnormality         Assessment and Plan:   Payal Long is a 54 year old     Problem list:   # Fatigue  # Night sweats  # Brain fog  # Low RBC and MCHC  # Antibody negative hyperthyroidism  # Thyromegaly without nodule  # COVID-19 infection 2021 or 2022  # History of low B12  # History of positive BRANDT  # Family history of Sjogren's syndrome and alopecia aerata     Patient has has history of low RBC and MCHC for multiple years and thus referred for further workup and evaluation by hematology. She has been increasingly fatigued over the last year and has had to cut back on her work hours because of this. She has had brain fog, intermittent night sweats, and feels off balance. Her last CBC on 9/4/24 notable for RBC 3.94, MCHC 31.8, Hgb 12.1, MCV 97. It is possible that nutrient deficiency like B12 or iron could be responsible for her symptoms. However, she had iron studies checked in November 2023 that were normal. She has never been iron deficient per chart review. She had a B12 of 237 in 2017. Most recently B12 checked in May this year and was 862. She never had a intrinsic factor antibody level checked. It is possible that she has some autoimmunity causing low B12 level in the past. She is not vegan or vegetarian.     She does not have a personal history significant for autoimmune disease, but she has had a positive BRANDT in the past. She does have intermittent dry eyes and dry mouth. She did have one salivary gland removed as her dentist found it to be enlarged and obstructive. Her mother does have Sjogren's disease and alopecia aerata. She had post viral thyroiditis in September last year with significant symptoms associated with hyperthyroidism requiring treatment with methimazole. Her anti TPO as well as anti TSI and thyroid receptor antibodies are negative at this  time. She became hypothyroid and is now euthyroid, no longer on methimazole. Her TSH and T4 were last checked on 9/4/24 and were normal. No thyroid nodules identified on ultrasound. She was following with endocrinology with Dr. Kelly but is no longer. It is possible that she does have an underlying autoimmune versus chronic inflammatory state causing her symptoms. She should have a further autoimmune workup done at this time. She also had COVID-19 infection in late 2021 or early 2022. 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 The exact etiology of her symptoms remains unclear at this time and further workup is indicated.    Summary of Recommendations:  Obtain CBC, CMP, reticulocytes, peripheral smear, protein electrophoresis  Obtain iron studies and ferritin, B12, folate, homocysteine, methylmalonic acid, and LDH  Obtain ESR, CRP, anti SSA, BRANDT, C3, C4, anti parietal cell antibody, anti intrinsic factor antibody  Follow up with virtual visit to discuss lab results in 1 month    Iwona Irvin  Medical Student [facility]  ATTENDING ATTESTATION    I was present with the medical student who participated in the service and in the documentation  of the note. I have verified the history and personally performed the physical exam and medical  decision making. I agree with the assessment and plan of care as documented in the note    Thank you for involving us in the care of this patient.     I spent a total of 60  minutes face to face with Payal Long during today's office visit. Over 50% of this time was spent counseling the patient and coordinating the care regarding anemia and fatigue and 30 minutes preparing to see the patient and  care coordination The longitudinal plan of care for the diagnosis(es)/condition(s) as documented were addressed during this visit. Due to the added complexity in care, I will continue to support Payal in the  subsequent management and with ongoing continuity of care.      Benny Flores MD          History of Present Illness:   History obtained from chart review and confirmed with patient.  Payal Long is a 54 year old her for hemology consult for RBC abnormality.    She reports that she has had low RBC and low MCHC for a while but is not sure how long. On 9/4/24 her RBC of 3.94 and MCHC 31.8. Her hemoglobin was 12.1 and MCV 97 at this time. B12 and iron studies were recently normal in the last year, but B12 has been low in the past. She does not think she has been iron deficient before. She is not vegetarian or vegan.     She reports that she has excessive fatigue at baseline that has been going on for a while. She also has had some brain fog. She endorses some shortness of breath with exertion. She has also had some night sweats, but does not soak the sheets. She attributes some of her night sweats to going through menopause. She has been more exhausted at work especially over the last year. 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 She had to request to have a 2 hour break between her classes and she feels exhausted at the end of the day. She used to be able to teach all day with no problems with fatigue. She takes trazodone before bed while helps her sleep, but doesn't wake up feeling refreshed. Normally sleeps between 6-8 hours. She has recently gained some weight but she attributes this to that she lost about 20 pounds last year with the hyperthyroidism. She said she got hyperthyroid after having a URI. She says that her thyroid is back to normal now. She is no longer on methimazole. She also has intermittent dry mouth and dry eyes. Of note she had COVID-19 infection in maybe end of 2021 or early 2022, unclear the exact date. She did not have Paxlovid.          Review of Systems:   A comprehensive ROS was performed with the patient and was found to be negative with the exception of  "that noted in the HPI above.  CONSTITUTIONAL She is chronically fatigued. Night sweats that she attributes to menopause, she does not soak the sheets. No fevers. Normal appetite. Weight gain recently, she lost 20 pounds last year when she had hyperthyroidism.   EYES No vision changes. Intermittent dry eyes.   EARS No hearing changes  RESP Short of breath with exertion. No asthma history. No cough. Recently had strep throat.   COR No known heart murmur. Had palpitations and tachycardia with hyperthyroidism. No chest pain or MI. Has elevated cholesterol, no hypertension.  GI Unable to swallow large pills, they get stuck. No trouble swallowing liquids. Intermittent acid reflux. Occasional diarrhea. No nausea or vomiting. No blood in stool or black stools.   MS Has chronic right hip pain. Back aches intermittently.   ENDO Hyperthyroidism a year ago after being ill with a URI. No thyroid nodules. No diabetes.   NEURO She feels like she is \"off kilter\" like she might lose her balance, but doesn't feel like she is going to pass out. She has not loss consciousness. Endorses some brain fog. Tingling in bilateral calves which she attributes to her varicose veins.   PSYCH Has anxiety but no depression.   SKIN No rashes or bruising. No significant pallor.   ID No significant infections that require her to be hospitalized.  HEME Not sure if she bruises easily. No gum bleeding or epistaxis. No excessive cutaneous bleeding.    Normal urination. No hematuria.          Past Medical History:   Varicose veins  Hemorrhoids  Antibody negative hyperthyroidism  COVID-19 infection  Reflex sympathic dystrophy following metatarsal surgery  History of positive BRANDT  No autoimmune history  No cancer history       Past Surgical History:     Past Surgical History:   Procedure Laterality Date    COLONOSCOPY N/A 6/10/2024    Procedure: Colonoscopy;  Surgeon: Ruel Yu MD;  Location:  GI   Dental implant surgery  5th metatarsal " surgery  Salivary gland removed       Social History:     Social History     Socioeconomic History    Marital status:      Spouse name: Not on file    Number of children: Not on file    Years of education: Not on file    Highest education level: Not on file   Occupational History    Not on file   Tobacco Use    Smoking status: Never    Smokeless tobacco: Never   Substance and Sexual Activity    Alcohol use: Not on file    Drug use: Not on file    Sexual activity: Not on file   Other Topics Concern    Not on file   Social History Narrative    Not on file     Social Drivers of Health     Financial Resource Strain: Not on file   Food Insecurity: Not on file   Transportation Needs: Not on file   Physical Activity: Not on file   Stress: Not on file   Social Connections: Unknown (9/10/2023)    [facility] [email or web address] [facility] [email or web address] [facility] [employer] [facility]    Social Connections     Frequency of Communication with Friends and Family: Not on file   Interpersonal Safety: Not on file   Housing Stability: Not on file     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 She is  with 3 kids. She drinks a glass of wine with dinner. Does not smoke cigarettes.          Family History:     Father is passed away. He had a medical event while driving, the exact cause is not known. He never had cancer. Paternal grandmother with stomach cancer. Mother is alive, she had breast cancer, hyperparathyroidism, alopecia aerata, Sjogren's disease. She has a brother that is healthy as far as she knows.          Allergies:     Allergies   Allergen Reactions    Fentanyl Dizziness, Nausea and Vomiting and Other (See Comments)     When used for sedation - not had patch or PO form   Doxycyline gets sun rash        Medications:   (Not in a hospital admission)  Tradazone for sleep         Physical Exam:   /85 (BP Location: Right  "arm, Patient Position: Right side, Cuff Size: Adult Regular)   Pulse 96   Temp 98.2  F (36.8  C) (Oral)   Resp 16   Ht 1.645 m (5' 4.76\")   Wt 66.8 kg (147 lb 4.8 oz)   SpO2 100%   BMI 24.69 kg/m        General: Appears well, in no acute distress.  Heme/Lymph: No overt bleeding. No cervical, axillary, or supraclavicular adenopathy.  Skin: No concerning lesions, rash, jaundice, cyanosis, erythema, or ecchymoses on exposed surfaces.  HEENT: NCAT. EOMI, anicteric sclera. Oral mucosa pink and moist with no lesions or thrush.   Neck: Thyromegaly present easily palpable  No appreciable thyroid nodules.   Respiratory: Non-labored breathing, good air exchange, lungs clear to auscultation bilaterally.  Cardiovascular: Regular rate and rhythm. No murmur or rub.   Gastrointestinal: Normoactive bowel sounds. Abdomen soft, non-distended, and non-tender. No palpable masses or organomegaly. Liver span about 7 cm.   Extremities: No extremity edema.   Neurologic: A&O x 3, speech normal, sensation to light touch grossly WNL.         Data:   I have personally reviewed the following labs/imaging:  CBC 9/4/24  WBC OP  4.3 - 10.8 K/UL 9.1   RBC OP  4.20 - 5.40 M/UL 3.94 Low    HEMOGLOBIN OP  12.0 - 16.0 gm/dL 12.1   HEMATOCRIT OP  36.0 - 48.0 % 38.1   MCV OP  80 - 100 fL 97   MCH OP  27.0 - 33.0 pg 30.7   MCHC OP  33.0 - 36.0 gm/dL 31.8 Low    RDW OP  11.5 - 14.5 % 13.5   PLATELET COUNT OP  150 - 400 K/   MPV OP  6.5 - 12.0 fL 10.1   PMN % OP  % 62.1   LYMPHOCYTE % OP  % 29.2   MONOCYTE % OP  % 7.3   EOSINOPHIL % OP  % 1.2   BASOPHIL % OP  % 0.2   PMN ABSOLUTE OP  1.80 - 7.80 K/uL 5.67   LYMPHOCYTE ABSOLUTE OP  1.00 - 4.00 K/uL 2.67   MONOCYTE ABSOLUTE OP  0.00 - 1.00 K/uL 0.67   EOSINOPHIL ABSOLUTE OP  0.00 - 0.45 K/uL 0.11   BASOPHIL ABSOLUTE OP  0.00 - 0.20 K/uL 0.02     Thyroid function tests 9/24/24  TSH ([facility])  0.450 - 4.500 uIU/mL 1.180   T4, Free (Direct) ([facility])  0.82 - 1.77 ng/dL 1.32     T3 Triiodothyronine " ([facility])  71 - 180 ng/dL 84     B12 5/8/24  Vitamin B12 ([facility])  232 - 1245 pg/mL 862     Vitamin D 5/8/24  Vitamin D,25 Hydroxy ([facility])  30.0 - 100.0 ng/mL 46.6     Iron Studies 11/27/23  Component  Ref Range & Units 11 mo ago   Iron Binding Capacity/TIBC ([facility])  250 - 450 ug/dL 309   UIBC ([facility])  131 - 425 ug/dL 241   Iron ([facility])  27 - 159 ug/dL 68   Ferritin ([facility])  15 - 150 ng/mL 113   Transferrin ([facility])  192 - 364 mg/dL 240     US Parathyroid 9/10/23  US PARATHYROID  Order: [id number]  Impression    1.  Prominent vascularity throughout both lobes of the thyroid. No nodules.

## 2024-11-06 ENCOUNTER — ONCOLOGY VISIT (OUTPATIENT)
Dept: TRANSPLANT | Facility: CLINIC | Age: 54
End: 2024-11-06
Attending: INTERNAL MEDICINE
Payer: COMMERCIAL

## 2024-11-06 ENCOUNTER — APPOINTMENT (OUTPATIENT)
Dept: LAB | Facility: CLINIC | Age: 54
End: 2024-11-06
Attending: INTERNAL MEDICINE
Payer: COMMERCIAL

## 2024-11-06 ENCOUNTER — PATIENT OUTREACH (OUTPATIENT)
Dept: ONCOLOGY | Facility: CLINIC | Age: 54
End: 2024-11-06

## 2024-11-06 VITALS
WEIGHT: 147.3 LBS | OXYGEN SATURATION: 100 % | DIASTOLIC BLOOD PRESSURE: 85 MMHG | BODY MASS INDEX: 24.54 KG/M2 | TEMPERATURE: 98.2 F | HEART RATE: 96 BPM | SYSTOLIC BLOOD PRESSURE: 129 MMHG | HEIGHT: 65 IN | RESPIRATION RATE: 16 BRPM

## 2024-11-06 DIAGNOSIS — E05.90 HYPERTHYROIDISM: ICD-10-CM

## 2024-11-06 DIAGNOSIS — E53.8 VITAMIN B12 DEFICIENCY (NON ANEMIC): Primary | ICD-10-CM

## 2024-11-06 DIAGNOSIS — E61.1 IRON DEFICIENCY: ICD-10-CM

## 2024-11-06 LAB
ALBUMIN SERPL BCG-MCNC: 4.4 G/DL (ref 3.5–5.2)
ALP SERPL-CCNC: 70 U/L (ref 40–150)
ALT SERPL W P-5'-P-CCNC: 12 U/L (ref 0–50)
ANION GAP SERPL CALCULATED.3IONS-SCNC: 10 MMOL/L (ref 7–15)
AST SERPL W P-5'-P-CCNC: 19 U/L (ref 0–45)
BASOPHILS # BLD AUTO: 0 10E3/UL (ref 0–0.2)
BASOPHILS NFR BLD AUTO: 0 %
BILIRUB SERPL-MCNC: 0.3 MG/DL
BUN SERPL-MCNC: 16.6 MG/DL (ref 6–20)
CALCIUM SERPL-MCNC: 9.6 MG/DL (ref 8.8–10.4)
CHLORIDE SERPL-SCNC: 101 MMOL/L (ref 98–107)
CREAT SERPL-MCNC: 1.17 MG/DL (ref 0.51–0.95)
CRP SERPL-MCNC: <3 MG/L
EGFRCR SERPLBLD CKD-EPI 2021: 56 ML/MIN/1.73M2
EOSINOPHIL # BLD AUTO: 0.2 10E3/UL (ref 0–0.7)
EOSINOPHIL NFR BLD AUTO: 2 %
ERYTHROCYTE [DISTWIDTH] IN BLOOD BY AUTOMATED COUNT: 13.2 % (ref 10–15)
ERYTHROCYTE [SEDIMENTATION RATE] IN BLOOD BY WESTERGREN METHOD: 7 MM/HR (ref 0–30)
FERRITIN SERPL-MCNC: 97 NG/ML (ref 11–328)
GLUCOSE SERPL-MCNC: 95 MG/DL (ref 70–99)
HCO3 SERPL-SCNC: 27 MMOL/L (ref 22–29)
HCT VFR BLD AUTO: 42.1 % (ref 35–47)
HCT VFR BLD AUTO: 42.1 % (ref 35–47)
HCYS SERPL-SCNC: 8.6 UMOL/L (ref 0–15)
HGB BLD-MCNC: 13.7 G/DL (ref 11.7–15.7)
IMM GRANULOCYTES # BLD: 0 10E3/UL
IMM GRANULOCYTES NFR BLD: 0 %
IRON BINDING CAPACITY (ROCHE): 313 UG/DL (ref 240–430)
IRON SATN MFR SERPL: 33 % (ref 15–46)
IRON SERPL-MCNC: 104 UG/DL (ref 37–145)
LDH SERPL L TO P-CCNC: 158 U/L (ref 0–250)
LYMPHOCYTES # BLD AUTO: 2.1 10E3/UL (ref 0.8–5.3)
LYMPHOCYTES NFR BLD AUTO: 23 %
MCH RBC QN AUTO: 31.3 PG (ref 26.5–33)
MCHC RBC AUTO-ENTMCNC: 32.5 G/DL (ref 31.5–36.5)
MCV RBC AUTO: 96 FL (ref 78–100)
MONOCYTES # BLD AUTO: 0.6 10E3/UL (ref 0–1.3)
MONOCYTES NFR BLD AUTO: 6 %
NEUTROPHILS # BLD AUTO: 6.4 10E3/UL (ref 1.6–8.3)
NEUTROPHILS NFR BLD AUTO: 69 %
NRBC # BLD AUTO: 0 10E3/UL
NRBC BLD AUTO-RTO: 0 /100
PLATELET # BLD AUTO: 271 10E3/UL (ref 150–450)
POTASSIUM SERPL-SCNC: 4 MMOL/L (ref 3.4–5.3)
PROT SERPL-MCNC: 7.8 G/DL (ref 6.4–8.3)
RBC # BLD AUTO: 4.38 10E6/UL (ref 3.8–5.2)
RETICS # AUTO: 0.05 10E6/UL (ref 0.03–0.1)
RETICS/RBC NFR AUTO: 1.1 % (ref 0.5–2)
SODIUM SERPL-SCNC: 138 MMOL/L (ref 135–145)
TOTAL PROTEIN SERUM FOR ELP: 7.4 G/DL (ref 6.4–8.3)
TSH SERPL DL<=0.005 MIU/L-ACNC: 1.05 UIU/ML (ref 0.3–4.2)
WBC # BLD AUTO: 9.3 10E3/UL (ref 4–11)

## 2024-11-06 PROCEDURE — 84132 ASSAY OF SERUM POTASSIUM: CPT | Performed by: INTERNAL MEDICINE

## 2024-11-06 PROCEDURE — 84155 ASSAY OF PROTEIN SERUM: CPT | Performed by: INTERNAL MEDICINE

## 2024-11-06 PROCEDURE — 85045 AUTOMATED RETICULOCYTE COUNT: CPT | Performed by: INTERNAL MEDICINE

## 2024-11-06 PROCEDURE — 84238 ASSAY NONENDOCRINE RECEPTOR: CPT | Performed by: INTERNAL MEDICINE

## 2024-11-06 PROCEDURE — 86162 COMPLEMENT TOTAL (CH50): CPT | Performed by: INTERNAL MEDICINE

## 2024-11-06 PROCEDURE — 85652 RBC SED RATE AUTOMATED: CPT | Performed by: INTERNAL MEDICINE

## 2024-11-06 PROCEDURE — 86160 COMPLEMENT ANTIGEN: CPT | Performed by: INTERNAL MEDICINE

## 2024-11-06 PROCEDURE — 83550 IRON BINDING TEST: CPT | Performed by: INTERNAL MEDICINE

## 2024-11-06 PROCEDURE — 82747 ASSAY OF FOLIC ACID RBC: CPT | Performed by: INTERNAL MEDICINE

## 2024-11-06 PROCEDURE — 83921 ORGANIC ACID SINGLE QUANT: CPT | Performed by: INTERNAL MEDICINE

## 2024-11-06 PROCEDURE — 84443 ASSAY THYROID STIM HORMONE: CPT | Performed by: INTERNAL MEDICINE

## 2024-11-06 PROCEDURE — 84165 PROTEIN E-PHORESIS SERUM: CPT | Mod: 26 | Performed by: STUDENT IN AN ORGANIZED HEALTH CARE EDUCATION/TRAINING PROGRAM

## 2024-11-06 PROCEDURE — 83615 LACTATE (LD) (LDH) ENZYME: CPT | Performed by: INTERNAL MEDICINE

## 2024-11-06 PROCEDURE — 86340 INTRINSIC FACTOR ANTIBODY: CPT | Performed by: INTERNAL MEDICINE

## 2024-11-06 PROCEDURE — 99205 OFFICE O/P NEW HI 60 MIN: CPT | Mod: GC | Performed by: INTERNAL MEDICINE

## 2024-11-06 PROCEDURE — G2211 COMPLEX E/M VISIT ADD ON: HCPCS | Performed by: INTERNAL MEDICINE

## 2024-11-06 PROCEDURE — 99207 BLOOD MORPHOLOGY PATHOLOGIST REVIEW: CPT | Performed by: STUDENT IN AN ORGANIZED HEALTH CARE EDUCATION/TRAINING PROGRAM

## 2024-11-06 PROCEDURE — 86140 C-REACTIVE PROTEIN: CPT | Performed by: INTERNAL MEDICINE

## 2024-11-06 PROCEDURE — 82728 ASSAY OF FERRITIN: CPT | Performed by: INTERNAL MEDICINE

## 2024-11-06 PROCEDURE — 83516 IMMUNOASSAY NONANTIBODY: CPT | Performed by: INTERNAL MEDICINE

## 2024-11-06 PROCEDURE — 99213 OFFICE O/P EST LOW 20 MIN: CPT | Performed by: INTERNAL MEDICINE

## 2024-11-06 PROCEDURE — 36415 COLL VENOUS BLD VENIPUNCTURE: CPT | Performed by: INTERNAL MEDICINE

## 2024-11-06 PROCEDURE — 84165 PROTEIN E-PHORESIS SERUM: CPT | Mod: TC | Performed by: STUDENT IN AN ORGANIZED HEALTH CARE EDUCATION/TRAINING PROGRAM

## 2024-11-06 PROCEDURE — 86038 ANTINUCLEAR ANTIBODIES: CPT | Performed by: INTERNAL MEDICINE

## 2024-11-06 PROCEDURE — 85025 COMPLETE CBC W/AUTO DIFF WBC: CPT | Performed by: INTERNAL MEDICINE

## 2024-11-06 PROCEDURE — 83090 ASSAY OF HOMOCYSTEINE: CPT | Performed by: INTERNAL MEDICINE

## 2024-11-06 PROCEDURE — 86235 NUCLEAR ANTIGEN ANTIBODY: CPT | Performed by: INTERNAL MEDICINE

## 2024-11-06 RX ORDER — ALBUTEROL SULFATE 90 UG/1
2 INHALANT RESPIRATORY (INHALATION)
COMMUNITY
Start: 2024-07-19

## 2024-11-06 ASSESSMENT — PAIN SCALES - GENERAL: PAINLEVEL_OUTOF10: NO PAIN (0)

## 2024-11-06 NOTE — PROGRESS NOTES
[facility]: Cancer Care Initial Note                                    Discussion with Patient:                                                      11/11/24: RN met with pt on 11/5/24 when in to see Dr. Jitendra Flores for her initial visit.  Introduced self and role of RN Care Coordinator at [facility]. Provided [facility] phone number which has options to speak with clinic coordinator (), a triage nurse about symptom questions and reach out to RNCC during business hours.  Also stated phone number rolls to a more general nurse line so there is a nurse available 24/7.   Discussed use of [employer] including to not use it for symptoms as well as response time expectations.       Goals          General    Other     Notes - Note edited  11/8/2024  5:36 PM by Juju Hammond RN    Goal Statement: I will use my clinic and care team resources as directed.  Date Goal set: 11/8/2024  Barriers: disease burden and multiple diagnoses  Strengths: motivation  Date to Achieve By: ongoing  Patient expressed understanding of goal: Yes; discussed on 11/6/24.  Action steps to achieve this goal:  I will contact triage with new, worsening or uncontrolled symptoms.   I will call with difficulties of scheduling and/or transportation.   I will not send urgent or symptomatic messages through [employer].   I will contact scheduling to arrange or make changes in my appointments.                 Assessment:                                                      Initial  Current living arrangement:: I live in a private home with spouse  Type of residence:: Private home - stairs  Informal Support system:: Spouse;Family;Friends;Parent;Children  Equipment Currently Used at Home: none  Bed or wheelchair confined:: No  Mobility Status: Independent  Transportation means:: Regular car;Family;Friend      Intervention/Education provided during outreach:                                                       BARI  completed learning assessment with pt on 11/6/24.  Completed initial assessment with pt on 11/6/24.  Discussed information regarding goal with pt on 11/6, though did not enter until 11/8/24.    Signature:  Juju Hammond RN, OCN

## 2024-11-06 NOTE — NURSING NOTE
"Oncology Rooming Note    November 6, 2024 1:07 PM   Payal Long is a 54 year old female who presents for:    Chief Complaint   Patient presents with    Oncology Clinic Visit     New Eval for Iron Deficiency     Initial Vitals: /85 (BP Location: Right arm, Patient Position: Right side, Cuff Size: Adult Regular)   Pulse 96   Temp 98.2  F (36.8  C) (Oral)   Resp 16   Ht 1.645 m (5' 4.76\")   Wt 66.8 kg (147 lb 4.8 oz)   SpO2 100%   BMI 24.69 kg/m   Estimated body mass index is 24.69 kg/m  as calculated from the following:    Height as of this encounter: 1.645 m (5' 4.76\").    Weight as of this encounter: 66.8 kg (147 lb 4.8 oz). Body surface area is 1.75 meters squared.  No Pain (0) Comment: Data Unavailable   No LMP recorded.  Allergies reviewed: Yes  Medications reviewed: Yes    Medications: Medication refills not needed today.  Pharmacy name entered into [facility]: [id number] - [place], [place] - [address] [facility] [address]    Frailty Screening:   Is the patient here for a new oncology consult visit in cancer care? 2. No      Clinical concerns: none       Lori Arvizu MA             "

## 2024-11-06 NOTE — LETTER
11/6/2024      Payal Long  [address] [place] [address]      Dear Colleague,    Thank you for referring your patient, Payal Long, to the [facility] BLOOD AND MARROW TRANSPLANT PRO[facility] [place]. Please see a copy of my visit note below.     Hematology/Oncology Consult Note    Payal Long MRN# [id number]   Age: 54 year old YOB: 1970          Reason for Consult:   RBC abnormality         Assessment and Plan:   Payal Long is a 54 year old     Problem list:   # Fatigue  # Night sweats  # Brain fog  # Low RBC and MCHC  # Antibody negative hyperthyroidism  # Thyromegaly without nodule  # COVID-19 infection 2021 or 2022  # History of low B12  # History of positive BRANDT  # Family history of Sjogren's syndrome and alopecia aerata     Patient has has history of low RBC and MCHC for multiple years and thus referred for further workup and evaluation by hematology. She has been increasingly fatigued over the last year and has had to cut back on her work hours because of this. She has had brain fog, intermittent night sweats, and feels off balance. Her last CBC on 9/4/24 notable for RBC 3.94, MCHC 31.8, Hgb 12.1, MCV 97. It is possible that nutrient deficiency like B12 or iron could be responsible for her symptoms. However, she had iron studies checked in November 2023 that were normal. She has never been iron deficient per chart review. She had a B12 of 237 in 2017. Most recently B12 checked in May this year and was 862. She never had a intrinsic factor antibody level checked. It is possible that she has some autoimmunity causing low B12 level in the past. She is not vegan or vegetarian.     She does not have a personal history significant for autoimmune disease, but she has had a positive BRANDT in the past. She does have intermittent dry eyes and dry mouth. She did have one salivary gland removed as her dentist found it to be enlarged and obstructive. Her mother does have Sjogren's  disease and alopecia aerata. She had post viral thyroiditis in September last year with significant symptoms associated with hyperthyroidism requiring treatment with methimazole. Her anti TPO as well as anti TSI and thyroid receptor antibodies are negative at this time. She became hypothyroid and is now euthyroid, no longer on methimazole. Her TSH and T4 were last checked on 9/4/24 and were normal. No thyroid nodules identified on ultrasound. She was following with endocrinology with Dr. Kelly but is no longer. It is possible that she does have an underlying autoimmune versus chronic inflammatory state causing her symptoms. She should have a further autoimmune workup done at this time. She also had COVID-19 infection in late 2021 or early 2022. 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 The exact etiology of her symptoms remains unclear at this time and further workup is indicated.    Summary of Recommendations:  Obtain CBC, CMP, reticulocytes, peripheral smear, protein electrophoresis  Obtain iron studies and ferritin, B12, folate, homocysteine, methylmalonic acid, and LDH  Obtain ESR, CRP, anti SSA, BRANDT, C3, C4, anti parietal cell antibody, anti intrinsic factor antibody  Follow up with virtual visit to discuss lab results in 1 month    Iwona Irvin  Medical Student [facility]  ATTENDING ATTESTATION    I was present with the medical student who participated in the service and in the documentation  of the note. I have verified the history and personally performed the physical exam and medical  decision making. I agree with the assessment and plan of care as documented in the note    Thank you for involving us in the care of this patient.     I spent a total of 60  minutes face to face with Payal Long during today's office visit. Over 50% of this time was spent counseling the patient and coordinating the care regarding anemia and  fatigue and 30 minutes preparing to see the patient and  care coordination The longitudinal plan of care for the diagnosis(es)/condition(s) as documented were addressed during this visit. Due to the added complexity in care, I will continue to support Payal in the subsequent management and with ongoing continuity of care.      Benny Flores MD          History of Present Illness:   History obtained from chart review and confirmed with patient.  Payal Long is a 54 year old her for hemology consult for RBC abnormality.    She reports that she has had low RBC and low MCHC for a while but is not sure how long. On 9/4/24 her RBC of 3.94 and MCHC 31.8. Her hemoglobin was 12.1 and MCV 97 at this time. B12 and iron studies were recently normal in the last year, but B12 has been low in the past. She does not think she has been iron deficient before. She is not vegetarian or vegan.     She reports that she has excessive fatigue at baseline that has been going on for a while. She also has had some brain fog. She endorses some shortness of breath with exertion. She has also had some night sweats, but does not soak the sheets. She attributes some of her night sweats to going through menopause. She has been more exhausted at work especially over the last year. 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 She had to request to have a 2 hour break between her classes and she feels exhausted at the end of the day. She used to be able to teach all day with no problems with fatigue. She takes trazodone before bed while helps her sleep, but doesn't wake up feeling refreshed. Normally sleeps between 6-8 hours. She has recently gained some weight but she attributes this to that she lost about 20 pounds last year with the hyperthyroidism. She said she got hyperthyroid after having a URI. She says that her thyroid is back to normal now. She is no longer on methimazole. She also has intermittent dry mouth  "and dry eyes. Of note she had COVID-19 infection in maybe end of 2021 or early 2022, unclear the exact date. She did not have Paxlovid.          Review of Systems:   A comprehensive ROS was performed with the patient and was found to be negative with the exception of that noted in the HPI above.  CONSTITUTIONAL She is chronically fatigued. Night sweats that she attributes to menopause, she does not soak the sheets. No fevers. Normal appetite. Weight gain recently, she lost 20 pounds last year when she had hyperthyroidism.   EYES No vision changes. Intermittent dry eyes.   EARS No hearing changes  RESP Short of breath with exertion. No asthma history. No cough. Recently had strep throat.   COR No known heart murmur. Had palpitations and tachycardia with hyperthyroidism. No chest pain or MI. Has elevated cholesterol, no hypertension.  GI Unable to swallow large pills, they get stuck. No trouble swallowing liquids. Intermittent acid reflux. Occasional diarrhea. No nausea or vomiting. No blood in stool or black stools.   MS Has chronic right hip pain. Back aches intermittently.   ENDO Hyperthyroidism a year ago after being ill with a URI. No thyroid nodules. No diabetes.   NEURO She feels like she is \"off kilter\" like she might lose her balance, but doesn't feel like she is going to pass out. She has not loss consciousness. Endorses some brain fog. Tingling in bilateral calves which she attributes to her varicose veins.   PSYCH Has anxiety but no depression.   SKIN No rashes or bruising. No significant pallor.   ID No significant infections that require her to be hospitalized.  HEME Not sure if she bruises easily. No gum bleeding or epistaxis. No excessive cutaneous bleeding.    Normal urination. No hematuria.          Past Medical History:   Varicose veins  Hemorrhoids  Antibody negative hyperthyroidism  COVID-19 infection  Reflex sympathic dystrophy following metatarsal surgery  History of positive BRANDT  No " autoimmune history  No cancer history       Past Surgical History:     Past Surgical History:   Procedure Laterality Date     COLONOSCOPY N/A 6/10/2024    Procedure: Colonoscopy;  Surgeon: Ruel Yu MD;  Location:  GI   Dental implant surgery  5th metatarsal surgery  Salivary gland removed       Social History:     Social History     Socioeconomic History     Marital status:      Spouse name: Not on file     Number of children: Not on file     Years of education: Not on file     Highest education level: Not on file   Occupational History     Not on file   Tobacco Use     Smoking status: Never     Smokeless tobacco: Never   Substance and Sexual Activity     Alcohol use: Not on file     Drug use: Not on file     Sexual activity: Not on file   Other Topics Concern     Not on file   Social History Narrative     Not on file     Social Drivers of Health     Financial Resource Strain: Not on file   Food Insecurity: Not on file   Transportation Needs: Not on file   Physical Activity: Not on file   Stress: Not on file   Social Connections: Unknown (9/10/2023)    [facility], [facility]    Social Connections      Frequency of Communication with Friends and Family: Not on file   Interpersonal Safety: Not on file   Housing Stability: Not on file     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 She is  with 3 kids. She drinks a glass of wine with dinner. Does not smoke cigarettes.          Family History:     Father is passed away. He had a medical event while driving, the exact cause is not known. He never had cancer. Paternal grandmother with stomach cancer. Mother is alive, she had breast cancer, hyperparathyroidism, alopecia aerata, Sjogren's disease. She has a brother that is healthy as far as she knows.          Allergies:     Allergies   Allergen  "Reactions     Fentanyl Dizziness, Nausea and Vomiting and Other (See Comments)     When used for sedation - not had patch or PO form   Doxycyline gets sun rash        Medications:   (Not in a hospital admission)  Tradazone for sleep         Physical Exam:   /85 (BP Location: Right arm, Patient Position: Right side, Cuff Size: Adult Regular)   Pulse 96   Temp 98.2  F (36.8  C) (Oral)   Resp 16   Ht 1.645 m (5' 4.76\")   Wt 66.8 kg (147 lb 4.8 oz)   SpO2 100%   BMI 24.69 kg/m        General: Appears well, in no acute distress.  Heme/Lymph: No overt bleeding. No cervical, axillary, or supraclavicular adenopathy.  Skin: No concerning lesions, rash, jaundice, cyanosis, erythema, or ecchymoses on exposed surfaces.  HEENT: NCAT. EOMI, anicteric sclera. Oral mucosa pink and moist with no lesions or thrush.   Neck: Thyromegaly present easily palpable  No appreciable thyroid nodules.   Respiratory: Non-labored breathing, good air exchange, lungs clear to auscultation bilaterally.  Cardiovascular: Regular rate and rhythm. No murmur or rub.   Gastrointestinal: Normoactive bowel sounds. Abdomen soft, non-distended, and non-tender. No palpable masses or organomegaly. Liver span about 7 cm.   Extremities: No extremity edema.   Neurologic: A&O x 3, speech normal, sensation to light touch grossly WNL.         Data:   I have personally reviewed the following labs/imaging:  CBC 9/4/24  WBC OP  4.3 - 10.8 K/UL 9.1   RBC OP  4.20 - 5.40 M/UL 3.94 Low    HEMOGLOBIN OP  12.0 - 16.0 gm/dL 12.1   HEMATOCRIT OP  36.0 - 48.0 % 38.1   MCV OP  80 - 100 fL 97   MCH OP  27.0 - 33.0 pg 30.7   MCHC OP  33.0 - 36.0 gm/dL 31.8 Low    RDW OP  11.5 - 14.5 % 13.5   PLATELET COUNT OP  150 - 400 K/   MPV OP  6.5 - 12.0 fL 10.1   PMN % OP  % 62.1   LYMPHOCYTE % OP  % 29.2   MONOCYTE % OP  % 7.3   EOSINOPHIL % OP  % 1.2   BASOPHIL % OP  % 0.2   PMN ABSOLUTE OP  1.80 - 7.80 K/uL 5.67   LYMPHOCYTE ABSOLUTE OP  1.00 - 4.00 K/uL 2.67 "   MONOCYTE ABSOLUTE OP  0.00 - 1.00 K/uL 0.67   EOSINOPHIL ABSOLUTE OP  0.00 - 0.45 K/uL 0.11   BASOPHIL ABSOLUTE OP  0.00 - 0.20 K/uL 0.02     Thyroid function tests 9/24/24  TSH ([facility])  0.450 - 4.500 uIU/mL 1.180   T4, Free (Direct) ([facility])  0.82 - 1.77 ng/dL 1.32     T3 Triiodothyronine ([facility])  71 - 180 ng/dL 84     B12 5/8/24  Vitamin B12 ([facility])  232 - 1245 pg/mL 862     Vitamin D 5/8/24  Vitamin D,25 Hydroxy ([facility])  30.0 - 100.0 ng/mL 46.6     Iron Studies 11/27/23  Component  Ref Range & Units 11 mo ago   Iron Binding Capacity/TIBC ([facility])  250 - 450 ug/dL 309   UIBC ([facility])  131 - 425 ug/dL 241   Iron ([facility])  27 - 159 ug/dL 68   Ferritin ([facility])  15 - 150 ng/mL 113   Transferrin ([facility])  192 - 364 mg/dL 240     US Parathyroid 9/10/23  US PARATHYROID  Order: [id number]  Impression    1.  Prominent vascularity throughout both lobes of the thyroid. No nodules.      Again, thank you for allowing me to participate in the care of your patient.        Sincerely,        Benny Flores MD

## 2024-11-06 NOTE — NURSING NOTE
Chief Complaint   Patient presents with    Oncology Clinic Visit     [facility] for Iron Deficiency    Blood Draw     Labs drawn via  by RN in lab.      Labs collected from venipuncture by RN.     Olivia Ignacio RN

## 2024-11-07 ENCOUNTER — TELEPHONE (OUTPATIENT)
Dept: SURGERY | Facility: CLINIC | Age: 54
End: 2024-11-07
Payer: COMMERCIAL

## 2024-11-07 LAB
ALBUMIN SERPL ELPH-MCNC: 4.6 G/DL (ref 3.7–5.1)
ALPHA1 GLOB SERPL ELPH-MCNC: 0.2 G/DL (ref 0.2–0.4)
ALPHA2 GLOB SERPL ELPH-MCNC: 0.6 G/DL (ref 0.5–0.9)
ANA SER QL IF: NEGATIVE
B-GLOBULIN SERPL ELPH-MCNC: 0.6 G/DL (ref 0.6–1)
C3 SERPL-MCNC: 133 MG/DL (ref 81–157)
C4 SERPL-MCNC: 19 MG/DL (ref 13–39)
CH50 SERPL-ACNC: 69 U/ML
GAMMA GLOB SERPL ELPH-MCNC: 1.4 G/DL (ref 0.7–1.6)
LOCATION OF TASK: NORMAL
M PROTEIN SERPL ELPH-MCNC: 0 G/DL
PATH REPORT.COMMENTS IMP SPEC: NORMAL
PATH REPORT.COMMENTS IMP SPEC: NORMAL
PATH REPORT.FINAL DX SPEC: NORMAL
PATH REPORT.MICROSCOPIC SPEC OTHER STN: NORMAL
PATH REPORT.MICROSCOPIC SPEC OTHER STN: NORMAL
PATH REPORT.RELEVANT HX SPEC: NORMAL
PROT PATTERN SERPL ELPH-IMP: NORMAL
STFR SERPL-MCNC: 2.4 MG/L

## 2024-11-07 NOTE — TELEPHONE ENCOUNTER
Left Voicemail (1st Attempt) and Sent Mychart (1st Attempt) for the patient to call back and schedule the following:    Appointment type: RET Patient   Provider:   Return date: 12/04/2024  Specialty phone number: [phone]  Additional appointment(s) needed: n/a  Additonal Notes: Provider unavailable, pt needs to reschedule

## 2024-11-08 ENCOUNTER — TELEPHONE (OUTPATIENT)
Dept: SURGERY | Facility: CLINIC | Age: 54
End: 2024-11-08
Payer: COMMERCIAL

## 2024-11-08 ENCOUNTER — PATIENT OUTREACH (OUTPATIENT)
Dept: ONCOLOGY | Facility: CLINIC | Age: 54
End: 2024-11-08
Payer: COMMERCIAL

## 2024-11-08 LAB
FOLATE RBC-MCNC: 695 NG/ML
IF BLOCK AB SER QL RIA: NEGATIVE
PCA IGG SER-ACNC: 2.3 UNITS

## 2024-11-08 NOTE — PROGRESS NOTES
"[facility]: Cancer Care Plan of Care Education Note                                    Discussion with Patient:                                                      Called pt this afternoon after she left message asking about elevated creat = 1.17 and GFR low at 56.      Assessment:                                                      Assessment completed with:: Patient    Plan of Care Education   Yearly learning assessment completed?: Yes (see Education tab)  Plan of Care:: Lab appointment;MD follow-up appointment  When to call provider::  (Discussed if abdominal pain worsens over weekend, she should call nurse line for advice.)    Evaluation of Learning  Patient Education Provided: Yes (Discussed hydration.  Pt reports drinking at least 64 oz of H2O daily & ~2 cups coffee every morning re: caffiene.  She thinks she was hydrated when labs were drawn on 11/6/24.)  Readiness:: Acceptance  Method:: Explanation  Response:: Verbalizes understanding    RNCC Short Symptom Review:     Assessment completed with:: Patient    General/Short Assessment       Pain  Patient Reported Pain?: Yes, is currently well-managed (Reports pain in front below L ribs.  Reports she has had this \"pretty consistent\" for last couple weeks.  Has had pain much of last 4 to 5 months.  Had imaging in March, 2023 which showed mod/large amt stool in colon.)  Pain Score: Moderate Pain (4) (Pt reports she has not taken anything for the pain.  Also, that she does not like to take medications. Daily BM sometimes seems to help reduce pain but not totally relieve it.)    Patient Coping       Clinic Utilization  Patient Aware of Next Appointment?: No, Reviewed with Patient (Discussed lab appt prior to her virtual visit with Dr. Flores)    Other Patient Concerns  Other Patient Reported Concerns: Yes, see notes (Pt asked about possible kidney infection.  Pt denied flank pain but see info about abdominal pain below L ribs.  No burning or urgency " with urination.  Reports frequent urination, likely related to drinking at least 64 oz H2O a day.)    Intervention/Education provided during outreach:                                                       See above.    Discussed author would send message to Dr. Jitendra Flores but not likely to hear back until next week (Monday).  Repeated if symptoms worsen, she should call and discuss with general triage nurse line during weekend for advice.  Pt appreciated call and information.    Signature:  Juju Hammond RN, OCN

## 2024-11-08 NOTE — TELEPHONE ENCOUNTER
Patient confirmed scheduled appointment:  Date: 01/15/25  Time: 630 pm   Visit type: Ret Patient  Provider:   Location: [facility]  Testing/imaging: n/a  Additional notes: Pt rescheduled from 12/04/24

## 2024-11-09 ENCOUNTER — MYC MEDICAL ADVICE (OUTPATIENT)
Dept: TRANSPLANT | Facility: CLINIC | Age: 54
End: 2024-11-09
Payer: COMMERCIAL

## 2024-11-11 ENCOUNTER — PATIENT OUTREACH (OUTPATIENT)
Dept: ONCOLOGY | Facility: CLINIC | Age: 54
End: 2024-11-11
Payer: COMMERCIAL

## 2024-11-11 LAB
ENA SS-A AB SER IA-ACNC: <0.5 U/ML
ENA SS-A AB SER IA-ACNC: NEGATIVE

## 2024-11-11 NOTE — TELEPHONE ENCOUNTER
Dr. Flores,    Pt scheduled for lab on Monday, 11/25, before her return visit with you on Tuesday, 11/26.    Any change to this plan?    Juju

## 2024-11-11 NOTE — TELEPHONE ENCOUNTER
"11/11/24:  Pre communication with Dr. Jitendra Flores, cancel pt's lab appt on 11/25 and return visit on 11/27 at this time.  He spoke with pt and informed her of this plan.  No return needed at this time but keep us on her care team \"for a bit\".  Juju Hammond, RN, OCN, RN Care Coordinator, [facility]    "

## 2024-11-11 NOTE — PROGRESS NOTES
[facility]: Cancer Care Short Note                                                                                          Completed chart audit to re-assign Oncology Care Coordination enrollment status after Dr. Flores and pt communication on 11/11/24.      Juju Hammond, RN, OCN   RN Care Coordinator   [facility]    .

## 2024-11-13 LAB — METHYLMALONATE SERPL-SCNC: 0.23 UMOL/L (ref 0–0.4)

## 2025-01-14 NOTE — PROGRESS NOTES
Colon and Rectal Surgery Follow-Up Clinic Note    RE: Payal Long  : 1970  VIDHI: 1/15/2025      Payal Long is a very pleasant 54 year old female here for follow-up of symptomatic internal hemorrhoids s/p hemorrhoid banding 10/4/23 and 23.    Interval history: Pyaal reports doing well after the last banding, however she began having symptoms after doing a bowel prep for her colonoscopy. Since then she reports intermittent rectal bleeding with BMs and wiping. No pain. She has a couple BMs daily.     Colonoscopy 6/10/24 was normal (7-10 year recall), only hemorrhoids noted on perianal exam.    PLEASE SEE NOTE BELOW FOR PHYSICAL EXAMINATION, REVIEW OF SYSTEMS, AND OTHER HISTORY.    Assessment/Plan: 54 year old female with symptomatic internal hemorrhoids and previously tolerated hemorrhoid banding well. Discussed repeat banding today which she is interested in. One band was placed at the right posterior column- see note below for details. Follow up as needed. No aspirin for 2 weeks. Patient's questions were answered to her stated satisfaction and she is in agreement with this plan.        I spent a total of 20 minutes on the day of the visit.  Time spent on date of encounter doing chart review, history and exam, documentation, and further activities in this note. An additional 5 minutes was spent for hemorrhoid banding.    Corrina Jones MD  Colon and Rectal Surgery Staff  [facility]      Medical history:  No past medical history on file.    Surgical history:  Past Surgical History:   Procedure Laterality Date    COLONOSCOPY N/A 6/10/2024    Procedure: Colonoscopy;  Surgeon: Ruel Yu MD;  Location:  GI       Problem list:  There are no active problems to display for this patient.      Medications:  Current Outpatient Medications   Medication Sig Dispense Refill    acetaminophen (TYLENOL) 325 MG tablet Take 325-650 mg by mouth every 6 hours as needed  "for mild pain      albuterol (PROAIR HFA/PROVENTIL HFA/VENTOLIN HFA) 108 (90 Base) MCG/ACT inhaler Inhale 2 puffs into the lungs.      traZODone (DESYREL) 50 MG tablet Take 50 mg by mouth         Allergies:  Allergies   Allergen Reactions    Doxycycline Other (See Comments)     Tingling in hands    Fentanyl Dizziness, Nausea and Vomiting and Other (See Comments)     When used for sedation - not had patch or PO form       Family history:  No family history on file.    Social history:  Social History     Socioeconomic History    Marital status:      Spouse name: Not on file    Number of children: Not on file    Years of education: Not on file    Highest education level: Not on file   Occupational History    Not on file   Tobacco Use    Smoking status: Never    Smokeless tobacco: Never   Substance and Sexual Activity    Alcohol use: Not on file    Drug use: Not on file    Sexual activity: Not on file   Other Topics Concern    Not on file   Social History Narrative    Not on file     Social Drivers of Health     Financial Resource Strain: Not on file   Food Insecurity: Not on file   Transportation Needs: Not on file   Physical Activity: Not on file   Stress: Not on file   Social Connections: Unknown (9/10/2023)    [facility]    Social Connections     Frequency of Communication with Friends and Family: Not on file   Interpersonal Safety: Not on file   Housing Stability: Not on file       Physical Examination:  /80 (BP Location: Left arm, Patient Position: Sitting, Cuff Size: Adult Regular)   Pulse 90   Ht 5' 4.75\"   Wt 147 lb 14.4 oz   SpO2 100%   BMI 24.80 kg/m    General: alert, oriented, in no acute distress, sitting comfortably  Respiratory: non-labored breathing on RA  Chaperone: Glendy Fagan PA-C   Perianal External Examination:  Perianal skin: Intact with no excoriation or lichenification.  Lesions: No " evidence of an external lesion, nodularity, or induration in the perianal region.  Eversion of buttocks: There was not evidence of an anal fissure. Details: N/A.  Skin tags or external hemorrhoids: None.    Digital Rectal Examination: Was performed.   Sphincter tone: Good.  Palpable lesions: No.  Other: None.  Bimanual examination: was not performed.    Anoscopy: Was performed.   Hemorrhoids: Yes. Small internal hemorrhoids, most prominent at the right posterior column and the right anterior column.  Lesions: No.    Procedures:  Procedure: Hemorrhoid Banding  After discussing the risks and benefits, the patient agreed to proceed with internal hemorrhoid banding.    Prior to the start of the procedure and with procedural staff participation, I verbally confirmed the patient s identity using two indicators, relevant allergies, that the procedure was appropriate and matched the consent or emergent situation, and that the correct equipment/implants were available. Immediately prior to starting the procedure I conducted the Time Out with the procedural staff and re-confirmed the patient s name, procedure, and site/side. (The Joint Commission universal protocol was followed.)  Yes    Sedation (Moderate or Deep): None    A suction hemorrhoidal  was used to place a total of 2 band(s) in the right posterior position(s). The first deployment failed and required a second deployment. Digital rectal exam following this confirmed successful deployment after this.    There was no significant bleeding. The patient tolerated the procedure well.

## 2025-01-15 ENCOUNTER — OFFICE VISIT (OUTPATIENT)
Dept: SURGERY | Facility: CLINIC | Age: 55
End: 2025-01-15
Payer: COMMERCIAL

## 2025-01-15 VITALS
HEIGHT: 65 IN | SYSTOLIC BLOOD PRESSURE: 125 MMHG | HEART RATE: 90 BPM | WEIGHT: 147.9 LBS | BODY MASS INDEX: 24.64 KG/M2 | DIASTOLIC BLOOD PRESSURE: 80 MMHG | OXYGEN SATURATION: 100 %

## 2025-01-15 DIAGNOSIS — K62.5 RECTAL BLEEDING: ICD-10-CM

## 2025-01-15 DIAGNOSIS — K64.8 INTERNAL HEMORRHOID: Primary | ICD-10-CM

## 2025-01-15 ASSESSMENT — PAIN SCALES - GENERAL: PAINLEVEL_OUTOF10: NO PAIN (0)

## 2025-01-15 NOTE — NURSING NOTE
"Chief Complaint   Patient presents with    Follow Up       Vitals:    01/15/25 1718   BP: 125/80   BP Location: Left arm   Patient Position: Sitting   Cuff Size: Adult Regular   Pulse: 90   SpO2: 100%   Weight: 147 lb 14.4 oz   Height: 5' 4.75\"       Body mass index is 24.8 kg/m .    Vaishali Keller CMA    "

## 2025-01-15 NOTE — LETTER
1/15/2025       RE: Payal Long  [address] [place] [address]     Dear Colleague,    Thank you for referring your patient, Payal Long, to the [facility] COLON AND RECTAL SURGERY CLINIC [place] at [facility]. Please see a copy of my visit note below.    Colon and Rectal Surgery Follow-Up Clinic Note    RE: Payal Long  : 1970  VIDHI: 1/15/2025      Payal Long is a very pleasant 54 year old female here for follow-up of symptomatic internal hemorrhoids s/p hemorrhoid banding 10/4/23 and 23.    Interval history: Payal reports doing well after the last banding, however she began having symptoms after doing a bowel prep for her colonoscopy. Since then she reports intermittent rectal bleeding with BMs and wiping. No pain. She has a couple BMs daily.     Colonoscopy 6/10/24 was normal (7-10 year recall), only hemorrhoids noted on perianal exam.    PLEASE SEE NOTE BELOW FOR PHYSICAL EXAMINATION, REVIEW OF SYSTEMS, AND OTHER HISTORY.    Assessment/Plan: 54 year old female with symptomatic internal hemorrhoids and previously tolerated hemorrhoid banding well. Discussed repeat banding today which she is interested in. One band was placed at the right posterior column- see note below for details. Follow up as needed. No aspirin for 2 weeks. Patient's questions were answered to her stated satisfaction and she is in agreement with this plan.        I spent a total of 20 minutes on the day of the visit.  Time spent on date of encounter doing chart review, history and exam, documentation, and further activities in this note. An additional 5 minutes was spent for hemorrhoid banding.    Corrina Jones MD  Colon and Rectal Surgery Staff  [facility]      Medical history:  No past medical history on file.    Surgical history:  Past Surgical History:   Procedure Laterality Date     COLONOSCOPY N/A 6/10/2024     Procedure: Colonoscopy;  Surgeon: Ruel Yu MD;  Location:  GI       Problem list:  There are no active problems to display for this patient.      Medications:  Current Outpatient Medications   Medication Sig Dispense Refill     acetaminophen (TYLENOL) 325 MG tablet Take 325-650 mg by mouth every 6 hours as needed for mild pain       albuterol (PROAIR HFA/PROVENTIL HFA/VENTOLIN HFA) 108 (90 Base) MCG/ACT inhaler Inhale 2 puffs into the lungs.       traZODone (DESYREL) 50 MG tablet Take 50 mg by mouth         Allergies:  Allergies   Allergen Reactions     Doxycycline Other (See Comments)     Tingling in hands     Fentanyl Dizziness, Nausea and Vomiting and Other (See Comments)     When used for sedation - not had patch or PO form       Family history:  No family history on file.    Social history:  Social History     Socioeconomic History     Marital status:      Spouse name: Not on file     Number of children: Not on file     Years of education: Not on file     Highest education level: Not on file   Occupational History     Not on file   Tobacco Use     Smoking status: Never     Smokeless tobacco: Never   Substance and Sexual Activity     Alcohol use: Not on file     Drug use: Not on file     Sexual activity: Not on file   Other Topics Concern     Not on file   Social History Narrative     Not on file     Social Drivers of Health     Financial Resource Strain: Not on file   Food Insecurity: Not on file   Transportation Needs: Not on file   Physical Activity: Not on file   Stress: Not on file   Social Connections: Unknown (9/10/2023)    Received from [facility], [facility]    Social Connections      Frequency of Communication with Friends and Family: Not on file   Interpersonal Safety: Not on file   Housing Stability: Not on file       Physical Examination:  /80 (BP Location: Left arm, Patient Position: Sitting, Cuff Size:  "Adult Regular)   Pulse 90   Ht 5' 4.75\"   Wt 147 lb 14.4 oz   SpO2 100%   BMI 24.80 kg/m    General: alert, oriented, in no acute distress, sitting comfortably  Respiratory: non-labored breathing on RA  Chaperone: Glendy Fagan PA-C   Perianal External Examination:  Perianal skin: Intact with no excoriation or lichenification.  Lesions: No evidence of an external lesion, nodularity, or induration in the perianal region.  Eversion of buttocks: There was not evidence of an anal fissure. Details: N/A.  Skin tags or external hemorrhoids: None.    Digital Rectal Examination: Was performed.   Sphincter tone: Good.  Palpable lesions: No.  Other: None.  Bimanual examination: was not performed.    Anoscopy: Was performed.   Hemorrhoids: Yes. Small internal hemorrhoids, most prominent at the right posterior column and the right anterior column.  Lesions: No.    Procedures:  Procedure: Hemorrhoid Banding  After discussing the risks and benefits, the patient agreed to proceed with internal hemorrhoid banding.    Prior to the start of the procedure and with procedural staff participation, I verbally confirmed the patient s identity using two indicators, relevant allergies, that the procedure was appropriate and matched the consent or emergent situation, and that the correct equipment/implants were available. Immediately prior to starting the procedure I conducted the Time Out with the procedural staff and re-confirmed the patient s name, procedure, and site/side. (The Joint Commission universal protocol was followed.)  Yes    Sedation (Moderate or Deep): None    A suction hemorrhoidal  was used to place a total of 2 band(s) in the right posterior position(s). The first deployment failed and required a second deployment. Digital rectal exam following this confirmed successful deployment after this.    There was no significant bleeding. The patient tolerated the procedure well.      Again, thank you for allowing me to " participate in the care of your patient.      Sincerely,    Corrina Jones MD

## 2025-01-16 NOTE — PATIENT INSTRUCTIONS
1. You may return in 4 weeks for another banding if symptoms continue.  2. There is a small risk of bleeding and remote chance of infection. Contact us in the interim if there are any concerns.   3. Try a fiber supplement such as Citrucel 2-3 times a day for constipation.  4. Can additionally us stool softeners or a laxative such as Miralax to keep stools soft.  5. Drink 8-10 glasses of water daily.   6. No heavy lifting or aspirin use for 3 weeks.    Ama RN: [phone]    Colorectal Surgery Clinic: [phone]

## 2025-03-05 ENCOUNTER — OFFICE VISIT (OUTPATIENT)
Dept: SURGERY | Facility: CLINIC | Age: 55
End: 2025-03-05
Payer: COMMERCIAL

## 2025-03-05 VITALS — HEART RATE: 97 BPM | OXYGEN SATURATION: 99 % | DIASTOLIC BLOOD PRESSURE: 81 MMHG | SYSTOLIC BLOOD PRESSURE: 126 MMHG

## 2025-03-05 DIAGNOSIS — K64.8 INTERNAL HEMORRHOID: Primary | ICD-10-CM

## 2025-03-05 ASSESSMENT — PAIN SCALES - GENERAL: PAINLEVEL_OUTOF10: NO PAIN (0)

## 2025-03-05 NOTE — LETTER
3/5/2025       RE: Payal Long  [address] [place] [address]     Dear Colleague,    Thank you for referring your patient, Payal Long, to the [facility] COLON AND RECTAL SURGERY CLINIC [place] [facility]. Please see a copy of my visit note below.    Colon and Rectal Surgery Follow-Up Clinic Note    RE: Payal Long  : 1970  VIDHI: 3/5/2025      Payal Long is a very pleasant 55 year old female here for follow-up of symptomatic internal hemorrhoids s/p hemorrhoid banding 10/4/23 (anterior), 23 (right anterior), and most recently 1/15/25 (right posterior).    Interval history: Did not feel improvement after the last hemorrhoid banding. Still having intermittent rectal bleeding. Also having a hemorrhoid flare that started yesterday so there is a swollen lump today. Bowel movements at baseline.    Last colonoscopy 6/10/24 was normal (7-10 year recall), only hemorrhoids noted on perianal exam.     PLEASE SEE NOTE BELOW FOR PHYSICAL EXAMINATION, REVIEW OF SYSTEMS, AND OTHER HISTORY.    Assessment/Plan: 55 year old female with symptomatic internal hemorrhoids. Previously responded to hemorrhoid banding but not this last one. We discussed options to repeat banding today vs proceed with examination under anesthesia. She preferred to have repeat hemorrhoid banding. See procedure note below. Will have our clinic reach out to her in a few weeks to see how this one went. If no improvement then can consider proceeding to examination under anesthesia with banding vs hemorrhoidectomy. Patient's questions were answered to her stated satisfaction and she is in agreement with this plan.         I spent a total of 20 minutes on the day of the visit.  Time spent on date of encounter doing chart review, history and exam, documentation, and further activities in this note. An additional 10 minutes was spent for hemorrhoid banding.    Corrina  MD Karen  Colon and Rectal Surgery Staff  [facility]      Medical history:  No past medical history on file.    Surgical history:  Past Surgical History:   Procedure Laterality Date     COLONOSCOPY N/A 6/10/2024    Procedure: Colonoscopy;  Surgeon: Ruel Yu MD;  Location:  GI       Problem list:  There are no active problems to display for this patient.      Medications:  Current Outpatient Medications   Medication Sig Dispense Refill     acetaminophen (TYLENOL) 325 MG tablet Take 325-650 mg by mouth every 6 hours as needed for mild pain       albuterol (PROAIR HFA/PROVENTIL HFA/VENTOLIN HFA) 108 (90 Base) MCG/ACT inhaler Inhale 2 puffs into the lungs.       traZODone (DESYREL) 50 MG tablet Take 50 mg by mouth         Allergies:  Allergies   Allergen Reactions     Doxycycline Other (See Comments)     Tingling in hands     Fentanyl Dizziness, Nausea and Vomiting and Other (See Comments)     When used for sedation - not had patch or PO form       Family history:  No family history on file.    Social history:  Social History     Socioeconomic History     Marital status:      Spouse name: Not on file     Number of children: Not on file     Years of education: Not on file     Highest education level: Not on file   Occupational History     Not on file   Tobacco Use     Smoking status: Never     Smokeless tobacco: Never   Substance and Sexual Activity     Alcohol use: Not on file     Drug use: Not on file     Sexual activity: Not on file   Other Topics Concern     Not on file   Social History Narrative     Not on file     Social Drivers of Health     Financial Resource Strain: Not on file   Food Insecurity: Not on file   Transportation Needs: Not on file   Physical Activity: Not on file   Stress: Not on file   Social Connections: Unknown (9/10/2023)    Received from [employer] [facility], [facility]    Social  Connections      Frequency of Communication with Friends and Family: Not on file   Interpersonal Safety: Not on file   Housing Stability: Not on file       Physical Examination:  /81 (BP Location: Left arm, Patient Position: Sitting, Cuff Size: Adult Regular)   Pulse 97   SpO2 99%   General: alert, oriented, in no acute distress, sitting comfortably  Respiratory: non-labored breathing on RA  Chaperone: Glendy Fagan PA-C   Perianal External Examination:  Perianal skin: Intact with no excoriation or lichenification.  Lesions: No evidence of an external lesion, nodularity, or induration in the perianal region.  Eversion of buttocks: There was not evidence of an anal fissure. Details: N/A.  Skin tags or external hemorrhoids: prolapsing internal hemorrhoid from the right posterior column, soft and non-thrombosed     Digital Rectal Examination: Was performed.   Sphincter tone: Good.  Palpable lesions: No.  Other: None.  Bimanual examination: was not performed.     Anoscopy: Was performed.   Hemorrhoids: Yes. Small internal hemorrhoids, most prominent at the right posterior column and the right anterior column.  Lesions: No.    Procedure: Hemorrhoid Banding  After discussing the risks and benefits, the patient agreed to proceed with internal hemorrhoid banding.    Prior to the start of the procedure and with procedural staff participation, I verbally confirmed the patient s identity using two indicators, relevant allergies, that the procedure was appropriate and matched the consent or emergent situation, and that the correct equipment/implants were available. Immediately prior to starting the procedure I conducted the Time Out with the procedural staff and re-confirmed the patient s name, procedure, and site/side. (The Joint Commission universal protocol was followed.)  Yes    Sedation (Moderate or Deep): None    A suction hemorrhoidal  was used to place a total of 1 band(s) in the right posterior  position(s).    There was no significant bleeding. The patient tolerated the procedure fairly well.      Again, thank you for allowing me to participate in the care of your patient.      Sincerely,    Corrina Jones MD

## 2025-03-05 NOTE — NURSING NOTE
Chief Complaint   Patient presents with    Follow Up       Vitals:    03/05/25 1541   BP: 126/81   BP Location: Left arm   Patient Position: Sitting   Cuff Size: Adult Regular   Pulse: 97   SpO2: 99%       There is no height or weight on file to calculate BMI.    Orion Larsen EMT-P

## 2025-03-05 NOTE — PROGRESS NOTES
Colon and Rectal Surgery Follow-Up Clinic Note    RE: Payal Long  : 1970  VIDHI: 3/5/2025      Payal Long is a very pleasant 55 year old female here for follow-up of symptomatic internal hemorrhoids s/p hemorrhoid banding 10/4/23 (anterior), 23 (right anterior), and most recently 1/15/25 (right posterior).    Interval history: Did not feel improvement after the last hemorrhoid banding. Still having intermittent rectal bleeding. Also having a hemorrhoid flare that started yesterday so there is a swollen lump today. Bowel movements at baseline.    Last colonoscopy 6/10/24 was normal (7-10 year recall), only hemorrhoids noted on perianal exam.     PLEASE SEE NOTE BELOW FOR PHYSICAL EXAMINATION, REVIEW OF SYSTEMS, AND OTHER HISTORY.    Assessment/Plan: 55 year old female with symptomatic internal hemorrhoids. Previously responded to hemorrhoid banding but not this last one. We discussed options to repeat banding today vs proceed with examination under anesthesia. She preferred to have repeat hemorrhoid banding. See procedure note below. Will have our clinic reach out to her in a few weeks to see how this one went. If no improvement then can consider proceeding to examination under anesthesia with banding vs hemorrhoidectomy. Patient's questions were answered to her stated satisfaction and she is in agreement with this plan.         I spent a total of 20 minutes on the day of the visit.  Time spent on date of encounter doing chart review, history and exam, documentation, and further activities in this note. An additional 10 minutes was spent for hemorrhoid banding.    Corrina Jones MD  Colon and Rectal Surgery Staff  [facility]      Medical history:  No past medical history on file.    Surgical history:  Past Surgical History:   Procedure Laterality Date    COLONOSCOPY N/A 6/10/2024    Procedure: Colonoscopy;  Surgeon: Ruel Yu MD;  Location:  GI        Problem list:  There are no active problems to display for this patient.      Medications:  Current Outpatient Medications   Medication Sig Dispense Refill    acetaminophen (TYLENOL) 325 MG tablet Take 325-650 mg by mouth every 6 hours as needed for mild pain      albuterol (PROAIR HFA/PROVENTIL HFA/VENTOLIN HFA) 108 (90 Base) MCG/ACT inhaler Inhale 2 puffs into the lungs.      traZODone (DESYREL) 50 MG tablet Take 50 mg by mouth         Allergies:  Allergies   Allergen Reactions    Doxycycline Other (See Comments)     Tingling in hands    Fentanyl Dizziness, Nausea and Vomiting and Other (See Comments)     When used for sedation - not had patch or PO form       Family history:  No family history on file.    Social history:  Social History     Socioeconomic History    Marital status:      Spouse name: Not on file    Number of children: Not on file    Years of education: Not on file    Highest education level: Not on file   Occupational History    Not on file   Tobacco Use    Smoking status: Never    Smokeless tobacco: Never   Substance and Sexual Activity    Alcohol use: Not on file    Drug use: Not on file    Sexual activity: Not on file   Other Topics Concern    Not on file   Social History Narrative    Not on file     Social Drivers of Health     Financial Resource Strain: Not on file   Food Insecurity: Not on file   Transportation Needs: Not on file   Physical Activity: Not on file   Stress: Not on file   Social Connections: Unknown (9/10/2023)    [facility] [employer] [facility] [employer] [facility]    Social Connections     Frequency of Communication with Friends and Family: Not on file   Interpersonal Safety: Not on file   Housing Stability: Not on file       Physical Examination:  /81 (BP Location: Left arm, Patient Position: Sitting, Cuff Size: Adult Regular)   Pulse 97   SpO2 99%   General: alert, oriented, in no acute distress,  sitting comfortably  Respiratory: non-labored breathing on RA  Chaperone: Glendy Fagan PA-C   Perianal External Examination:  Perianal skin: Intact with no excoriation or lichenification.  Lesions: No evidence of an external lesion, nodularity, or induration in the perianal region.  Eversion of buttocks: There was not evidence of an anal fissure. Details: N/A.  Skin tags or external hemorrhoids: prolapsing internal hemorrhoid from the right posterior column, soft and non-thrombosed     Digital Rectal Examination: Was performed.   Sphincter tone: Good.  Palpable lesions: No.  Other: None.  Bimanual examination: was not performed.     Anoscopy: Was performed.   Hemorrhoids: Yes. Small internal hemorrhoids, most prominent at the right posterior column and the right anterior column.  Lesions: No.    Procedure: Hemorrhoid Banding  After discussing the risks and benefits, the patient agreed to proceed with internal hemorrhoid banding.    Prior to the start of the procedure and with procedural staff participation, I verbally confirmed the patient s identity using two indicators, relevant allergies, that the procedure was appropriate and matched the consent or emergent situation, and that the correct equipment/implants were available. Immediately prior to starting the procedure I conducted the Time Out with the procedural staff and re-confirmed the patient s name, procedure, and site/side. (The Joint Commission universal protocol was followed.)  Yes    Sedation (Moderate or Deep): None    A suction hemorrhoidal  was used to place a total of 1 band(s) in the right posterior position(s).    There was no significant bleeding. The patient tolerated the procedure fairly well.

## 2025-03-05 NOTE — PATIENT INSTRUCTIONS
I will follow up with you in a few weeks and see how you're doing.  If things are going okay you can follow up as needed  If symptoms continue we can consider an exam under anesthesia with further hemorrhoid banding  Continue fiber and miralax as needed  Drink plenty of water  You may have some bleeding for the next few days, no heavy lifting or straining for 3 weeks    Ama RN: [phone]    Colorectal Surgery Clinic: [phone]

## 2025-03-27 ENCOUNTER — TELEPHONE (OUTPATIENT)
Dept: SURGERY | Facility: CLINIC | Age: 55
End: 2025-03-27
Payer: COMMERCIAL

## 2025-05-13 ENCOUNTER — TELEPHONE (OUTPATIENT)
Dept: SURGERY | Facility: CLINIC | Age: 55
End: 2025-05-13
Payer: COMMERCIAL

## 2025-05-13 NOTE — TELEPHONE ENCOUNTER
Left voicemail for patient regarding scheduling outpatient surgery with Dr. Corrina Jones .    Provided contact number to discuss.    P: [phone]    __    Mary jett on 5/13/2025 at 11:31 AM     5

## 2025-05-16 PROBLEM — K64.8 HEMORRHOID PROLAPSE: Status: ACTIVE | Noted: 2025-05-09

## 2025-05-16 PROBLEM — K64.8 INTERNAL HEMORRHOID: Status: ACTIVE | Noted: 2025-05-09

## 2025-05-18 ENCOUNTER — HEALTH MAINTENANCE LETTER (OUTPATIENT)
Age: 55
End: 2025-05-18

## 2025-08-01 ENCOUNTER — HOSPITAL ENCOUNTER (OUTPATIENT)
Facility: AMBULATORY SURGERY CENTER | Age: 55
Discharge: HOME | End: 2025-08-01
Attending: COLON & RECTAL SURGERY | Admitting: COLON & RECTAL SURGERY
Payer: COMMERCIAL

## 2025-08-01 VITALS
WEIGHT: 140 LBS | HEIGHT: 65 IN | SYSTOLIC BLOOD PRESSURE: 116 MMHG | DIASTOLIC BLOOD PRESSURE: 73 MMHG | BODY MASS INDEX: 23.32 KG/M2 | RESPIRATION RATE: 16 BRPM | HEART RATE: 83 BPM | OXYGEN SATURATION: 97 % | TEMPERATURE: 97 F

## 2025-08-01 DIAGNOSIS — K64.8 HEMORRHOID PROLAPSE: Primary | ICD-10-CM

## 2025-08-01 PROCEDURE — 46221 LIGATION OF HEMORRHOID(S): CPT | Mod: 59 | Performed by: COLON & RECTAL SURGERY

## 2025-08-01 PROCEDURE — 46945 INT HRHC LIG 1 HROID W/O IMG: CPT | Performed by: COLON & RECTAL SURGERY

## 2025-08-01 PROCEDURE — 88304 TISSUE EXAM BY PATHOLOGIST: CPT | Mod: 26 | Performed by: PATHOLOGY

## 2025-08-01 PROCEDURE — 88304 TISSUE EXAM BY PATHOLOGIST: CPT | Mod: TC | Performed by: COLON & RECTAL SURGERY

## 2025-08-01 RX ORDER — ONDANSETRON 2 MG/ML
4 INJECTION INTRAMUSCULAR; INTRAVENOUS EVERY 30 MIN PRN
Status: DISCONTINUED | OUTPATIENT
Start: 2025-08-01 | End: 2025-08-02 | Stop reason: HOSPADM

## 2025-08-01 RX ORDER — OXYCODONE HYDROCHLORIDE 5 MG/1
10 TABLET ORAL
Status: DISCONTINUED | OUTPATIENT
Start: 2025-08-01 | End: 2025-08-02 | Stop reason: HOSPADM

## 2025-08-01 RX ORDER — NALOXONE HYDROCHLORIDE 0.4 MG/ML
0.1 INJECTION, SOLUTION INTRAMUSCULAR; INTRAVENOUS; SUBCUTANEOUS
Status: DISCONTINUED | OUTPATIENT
Start: 2025-08-01 | End: 2025-08-02 | Stop reason: HOSPADM

## 2025-08-01 RX ORDER — DEXAMETHASONE SODIUM PHOSPHATE 10 MG/ML
4 INJECTION, SOLUTION INTRAMUSCULAR; INTRAVENOUS
Status: DISCONTINUED | OUTPATIENT
Start: 2025-08-01 | End: 2025-08-02 | Stop reason: HOSPADM

## 2025-08-01 RX ORDER — IBUPROFEN 200 MG
600 TABLET ORAL ONCE
Status: COMPLETED | OUTPATIENT
Start: 2025-08-01 | End: 2025-08-01

## 2025-08-01 RX ORDER — ACETAMINOPHEN 325 MG/1
975 TABLET ORAL ONCE
Status: COMPLETED | OUTPATIENT
Start: 2025-08-01 | End: 2025-08-01

## 2025-08-01 RX ORDER — METRONIDAZOLE 500 MG/100ML
500 INJECTION, SOLUTION INTRAVENOUS
Status: COMPLETED | OUTPATIENT
Start: 2025-08-01 | End: 2025-08-01

## 2025-08-01 RX ORDER — LIDOCAINE 40 MG/G
CREAM TOPICAL
Status: DISCONTINUED | OUTPATIENT
Start: 2025-08-01 | End: 2025-08-02 | Stop reason: HOSPADM

## 2025-08-01 RX ORDER — OXYCODONE HYDROCHLORIDE 5 MG/1
5 TABLET ORAL
Status: COMPLETED | OUTPATIENT
Start: 2025-08-01 | End: 2025-08-01

## 2025-08-01 RX ORDER — SODIUM CHLORIDE, SODIUM LACTATE, POTASSIUM CHLORIDE, CALCIUM CHLORIDE 600; 310; 30; 20 MG/100ML; MG/100ML; MG/100ML; MG/100ML
INJECTION, SOLUTION INTRAVENOUS CONTINUOUS
Status: DISCONTINUED | OUTPATIENT
Start: 2025-08-01 | End: 2025-08-02 | Stop reason: HOSPADM

## 2025-08-01 RX ORDER — GINSENG 100 MG
CAPSULE ORAL PRN
Status: DISCONTINUED | OUTPATIENT
Start: 2025-08-01 | End: 2025-08-01 | Stop reason: HOSPADM

## 2025-08-01 RX ORDER — OXYCODONE HYDROCHLORIDE 5 MG/1
5 TABLET ORAL EVERY 6 HOURS PRN
Qty: 6 TABLET | Refills: 0 | Status: SHIPPED | OUTPATIENT
Start: 2025-08-01 | End: 2025-08-04

## 2025-08-01 RX ORDER — HYDROMORPHONE HYDROCHLORIDE 1 MG/ML
.2-.4 INJECTION, SOLUTION INTRAMUSCULAR; INTRAVENOUS; SUBCUTANEOUS EVERY 10 MIN PRN
Status: DISCONTINUED | OUTPATIENT
Start: 2025-08-01 | End: 2025-08-02 | Stop reason: HOSPADM

## 2025-08-01 RX ORDER — CEFAZOLIN SODIUM 2 G/50ML
2 SOLUTION INTRAVENOUS
Status: COMPLETED | OUTPATIENT
Start: 2025-08-01 | End: 2025-08-01

## 2025-08-01 RX ORDER — ONDANSETRON 4 MG/1
4 TABLET, ORALLY DISINTEGRATING ORAL EVERY 30 MIN PRN
Status: DISCONTINUED | OUTPATIENT
Start: 2025-08-01 | End: 2025-08-02 | Stop reason: HOSPADM

## 2025-08-01 RX ADMIN — ACETAMINOPHEN 325 MG: 325 TABLET ORAL at 07:28

## 2025-08-01 RX ADMIN — HYDROMORPHONE HYDROCHLORIDE 0.3 MG: 1 INJECTION, SOLUTION INTRAMUSCULAR; INTRAVENOUS; SUBCUTANEOUS at 09:13

## 2025-08-01 RX ADMIN — OXYCODONE HYDROCHLORIDE 5 MG: 5 TABLET ORAL at 09:15

## 2025-08-01 RX ADMIN — HYDROMORPHONE HYDROCHLORIDE 0.2 MG: 1 INJECTION, SOLUTION INTRAMUSCULAR; INTRAVENOUS; SUBCUTANEOUS at 09:21

## 2025-08-01 RX ADMIN — METRONIDAZOLE 500 MG: 500 INJECTION, SOLUTION INTRAVENOUS at 07:40

## 2025-08-27 ENCOUNTER — OFFICE VISIT (OUTPATIENT)
Dept: SURGERY | Facility: CLINIC | Age: 55
End: 2025-08-27
Payer: COMMERCIAL

## 2025-08-27 VITALS
HEIGHT: 65 IN | OXYGEN SATURATION: 96 % | DIASTOLIC BLOOD PRESSURE: 73 MMHG | SYSTOLIC BLOOD PRESSURE: 103 MMHG | WEIGHT: 142.5 LBS | BODY MASS INDEX: 23.74 KG/M2 | HEART RATE: 91 BPM

## 2025-08-27 DIAGNOSIS — Z09 FOLLOW-UP EXAMINATION AFTER COLORECTAL SURGERY: Primary | ICD-10-CM

## 2025-08-27 PROCEDURE — 3078F DIAST BP <80 MM HG: CPT | Performed by: NURSE PRACTITIONER

## 2025-08-27 PROCEDURE — 99024 POSTOP FOLLOW-UP VISIT: CPT | Performed by: NURSE PRACTITIONER

## 2025-08-27 PROCEDURE — 3074F SYST BP LT 130 MM HG: CPT | Performed by: NURSE PRACTITIONER

## 2025-08-27 PROCEDURE — 1126F AMNT PAIN NOTED NONE PRSNT: CPT | Performed by: NURSE PRACTITIONER

## 2025-08-27 ASSESSMENT — PAIN SCALES - GENERAL: PAINLEVEL_OUTOF10: NO PAIN (0)

## (undated) DEVICE — PANTIES MESH LG/XLG 2PK 706M2

## (undated) DEVICE — SU MONOCRYL 4-0 PS-2 27" UND Y426H

## (undated) DEVICE — SU CHROMIC 4-0 SH 27" G121H

## (undated) DEVICE — TAPE DURAPORE 3" SILK 1538-3

## (undated) DEVICE — LINEN TOWEL PACK X6 WHITE 5487

## (undated) DEVICE — SOL WATER IRRIG 500ML BOTTLE 2F7113

## (undated) DEVICE — LINEN TOWEL PACK X5 5464

## (undated) DEVICE — PACK RECTAL UMMC

## (undated) DEVICE — ESU LIGASURE DISSECTOR EXACT LF2019

## (undated) RX ORDER — ONDANSETRON 2 MG/ML
INJECTION INTRAMUSCULAR; INTRAVENOUS
Status: DISPENSED
Start: 2024-06-10

## (undated) RX ORDER — IBUPROFEN 200 MG
TABLET ORAL
Status: DISPENSED
Start: 2025-08-01

## (undated) RX ORDER — DIPHENHYDRAMINE HYDROCHLORIDE 50 MG/ML
INJECTION INTRAMUSCULAR; INTRAVENOUS
Status: DISPENSED
Start: 2024-06-10

## (undated) RX ORDER — ONDANSETRON 2 MG/ML
INJECTION INTRAMUSCULAR; INTRAVENOUS
Status: DISPENSED
Start: 2025-08-01

## (undated) RX ORDER — METRONIDAZOLE 500 MG/100ML
INJECTION, SOLUTION INTRAVENOUS
Status: DISPENSED
Start: 2025-08-01

## (undated) RX ORDER — ACETAMINOPHEN 325 MG/1
TABLET ORAL
Status: DISPENSED
Start: 2025-08-01

## (undated) RX ORDER — DEXMEDETOMIDINE HYDROCHLORIDE 4 UG/ML
INJECTION, SOLUTION INTRAVENOUS
Status: DISPENSED
Start: 2025-08-01

## (undated) RX ORDER — KETOROLAC TROMETHAMINE 30 MG/ML
INJECTION, SOLUTION INTRAMUSCULAR; INTRAVENOUS
Status: DISPENSED
Start: 2025-08-01

## (undated) RX ORDER — CEFAZOLIN SODIUM 2 G/50ML
SOLUTION INTRAVENOUS
Status: DISPENSED
Start: 2025-08-01

## (undated) RX ORDER — OXYCODONE HYDROCHLORIDE 5 MG/1
TABLET ORAL
Status: DISPENSED
Start: 2025-08-01

## (undated) RX ORDER — FENTANYL CITRATE 50 UG/ML
INJECTION, SOLUTION INTRAMUSCULAR; INTRAVENOUS
Status: DISPENSED
Start: 2024-06-10

## (undated) RX ORDER — HYDROMORPHONE HYDROCHLORIDE 1 MG/ML
INJECTION, SOLUTION INTRAMUSCULAR; INTRAVENOUS; SUBCUTANEOUS
Status: DISPENSED
Start: 2025-08-01

## (undated) RX ORDER — DEXAMETHASONE SODIUM PHOSPHATE 4 MG/ML
INJECTION, SOLUTION INTRA-ARTICULAR; INTRALESIONAL; INTRAMUSCULAR; INTRAVENOUS; SOFT TISSUE
Status: DISPENSED
Start: 2025-08-01